# Patient Record
Sex: FEMALE | Race: BLACK OR AFRICAN AMERICAN | Employment: FULL TIME | URBAN - METROPOLITAN AREA
[De-identification: names, ages, dates, MRNs, and addresses within clinical notes are randomized per-mention and may not be internally consistent; named-entity substitution may affect disease eponyms.]

---

## 2017-05-17 ENCOUNTER — HOSPITAL ENCOUNTER (EMERGENCY)
Facility: HOSPITAL | Age: 32
Discharge: HOME | End: 2017-05-17
Attending: EMERGENCY MEDICINE
Payer: MEDICAID

## 2017-05-17 VITALS
HEART RATE: 82 BPM | WEIGHT: 264 LBS | OXYGEN SATURATION: 97 % | BODY MASS INDEX: 45.07 KG/M2 | RESPIRATION RATE: 18 BRPM | HEIGHT: 64 IN | TEMPERATURE: 98 F | SYSTOLIC BLOOD PRESSURE: 134 MMHG | DIASTOLIC BLOOD PRESSURE: 72 MMHG

## 2017-05-17 DIAGNOSIS — S80.02XA CONTUSION OF LEFT KNEE, INITIAL ENCOUNTER: ICD-10-CM
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
## 2017-05-17 PROCEDURE — 99282 EMERGENCY DEPT VISIT SF MDM: CPT

## 2017-05-17 RX ORDER — FLUTICASONE PROPIONATE 50 MCG
2 SPRAY, SUSPENSION (ML) NASAL DAILY
COMMUNITY

## 2017-05-18 NOTE — ED INITIAL ASSESSMENT (HPI)
Pt was shopping at [facility], fell over palate that was on the floor. Pt now has left armpit pain, and left knee pain. No LOC. No open wounds.

## 2017-05-18 NOTE — ED PROVIDER NOTES
Patient Seen in: [facility] AND [facility] Emergency Department    History   Patient presents with:  Fall (musculoskeletal, neurologic)      HPI    Patient presents after tripping on a pallet at a grocery store this afternoon and falling.   She states that she hi well-developed and well-nourished. No distress. HENT:   Head: Normocephalic and atraumatic. Eyes: Right eye exhibits no discharge. Left eye exhibits no discharge. Neck: Normal range of motion. Cardiovascular: Normal rate. No murmur heard.   Pulmo

## 2017-05-18 NOTE — ED NOTES
Pt states at [facility] this afternoon around 4pm when she was exiting and fell onto her face, left armpit and left knee all while injuring right back side. Pt rates pain 6/10. No abrasions or fluid palpated on knee. Ice pack provided.  Pt also denies LOC or hea

## 2018-12-22 ENCOUNTER — HOSPITAL ENCOUNTER (EMERGENCY)
Facility: HOSPITAL | Age: 33
Discharge: HOME | End: 2018-12-22
Attending: EMERGENCY MEDICINE
Payer: MEDICAID

## 2018-12-22 ENCOUNTER — APPOINTMENT (OUTPATIENT)
Dept: GENERAL RADIOLOGY | Facility: HOSPITAL | Age: 33
End: 2018-12-22
Attending: EMERGENCY MEDICINE
Payer: MEDICAID

## 2018-12-22 VITALS
HEIGHT: 64 IN | BODY MASS INDEX: 42.68 KG/M2 | SYSTOLIC BLOOD PRESSURE: 116 MMHG | OXYGEN SATURATION: 97 % | HEART RATE: 74 BPM | RESPIRATION RATE: 18 BRPM | TEMPERATURE: 98 F | DIASTOLIC BLOOD PRESSURE: 80 MMHG | WEIGHT: 250 LBS

## 2018-12-22 DIAGNOSIS — M94.0 COSTOCHONDRITIS: Primary | ICD-10-CM

## 2018-12-22 PROCEDURE — 84484 ASSAY OF TROPONIN QUANT: CPT | Performed by: EMERGENCY MEDICINE

## 2018-12-22 PROCEDURE — 85025 COMPLETE CBC W/AUTO DIFF WBC: CPT | Performed by: EMERGENCY MEDICINE

## 2018-12-22 PROCEDURE — 83880 ASSAY OF NATRIURETIC PEPTIDE: CPT | Performed by: EMERGENCY MEDICINE

## 2018-12-22 PROCEDURE — 99285 EMERGENCY DEPT VISIT HI MDM: CPT

## 2018-12-22 PROCEDURE — 96374 THER/PROPH/DIAG INJ IV PUSH: CPT

## 2018-12-22 PROCEDURE — 71046 X-RAY EXAM CHEST 2 VIEWS: CPT | Performed by: EMERGENCY MEDICINE

## 2018-12-22 PROCEDURE — 93010 ELECTROCARDIOGRAM REPORT: CPT | Performed by: EMERGENCY MEDICINE

## 2018-12-22 PROCEDURE — 93005 ELECTROCARDIOGRAM TRACING: CPT

## 2018-12-22 PROCEDURE — 80048 BASIC METABOLIC PNL TOTAL CA: CPT | Performed by: EMERGENCY MEDICINE

## 2018-12-22 RX ORDER — IBUPROFEN 600 MG/1
600 TABLET ORAL EVERY 6 HOURS PRN
Qty: 30 TABLET | Refills: 0 | Status: SHIPPED | OUTPATIENT
Start: 2018-12-22 | End: 2018-12-29

## 2018-12-22 RX ORDER — MONTELUKAST SODIUM 10 MG/1
10 TABLET ORAL NIGHTLY
COMMUNITY

## 2018-12-22 RX ORDER — FAMOTIDINE 10 MG
10 TABLET ORAL 2 TIMES DAILY
COMMUNITY

## 2018-12-22 RX ORDER — KETOROLAC TROMETHAMINE 30 MG/ML
30 INJECTION, SOLUTION INTRAMUSCULAR; INTRAVENOUS ONCE
Status: COMPLETED | OUTPATIENT
Start: 2018-12-22 | End: 2018-12-22

## 2018-12-26 NOTE — ED PROVIDER NOTES
Patient Seen in: [facility] AND [facility] Emergency Department    History   Patient presents with:  Chest Pain Angina (cardiovascular)    Stated Complaint: chest pain    HPI    34year old female who presents with chest pain after peristernal for the past 3-4 d Cardiovascular: Normal rate, regular rhythm, normal heart sounds and intact distal pulses. No murmur heard. Pulmonary/Chest: Effort normal and breath sounds normal. No accessory muscle usage or stridor. No respiratory distress.  She has no decreased hood 12/22/18 : 74  , sinus, normal for rate and rhythm    Radiology findings: Xr Chest Pa + Lat Chest (cpt=71046)    Result Date: 12/22/2018  CONCLUSION: Normal examination.       Dictated by (CST): Veronica Canada MD on 12/22/2018 at 18:42     Approved by (CS

## 2019-03-04 ENCOUNTER — HOSPITAL ENCOUNTER (EMERGENCY)
Facility: HOSPITAL | Age: 34
Discharge: HOME | End: 2019-03-04
Payer: MEDICAID

## 2019-03-04 ENCOUNTER — APPOINTMENT (OUTPATIENT)
Dept: GENERAL RADIOLOGY | Facility: HOSPITAL | Age: 34
End: 2019-03-04
Payer: MEDICAID

## 2019-03-04 VITALS
RESPIRATION RATE: 13 BRPM | SYSTOLIC BLOOD PRESSURE: 117 MMHG | TEMPERATURE: 98 F | HEIGHT: 64 IN | HEART RATE: 84 BPM | WEIGHT: 245 LBS | DIASTOLIC BLOOD PRESSURE: 55 MMHG | OXYGEN SATURATION: 98 % | BODY MASS INDEX: 41.83 KG/M2

## 2019-03-04 DIAGNOSIS — R07.89 CHEST PAIN, MUSCULOSKELETAL: Primary | ICD-10-CM

## 2019-03-04 LAB
ANION GAP SERPL CALC-SCNC: 7 MMOL/L (ref 0–18)
BASOPHILS # BLD AUTO: 0.04 X10(3) UL (ref 0–0.2)
BASOPHILS NFR BLD AUTO: 0.4 %
BUN BLD-MCNC: 12 MG/DL (ref 7–18)
BUN/CREAT SERPL: 17.6 (ref 10–20)
CALCIUM BLD-MCNC: 8.4 MG/DL (ref 8.5–10.1)
CHLORIDE SERPL-SCNC: 111 MMOL/L (ref 98–107)
CO2 SERPL-SCNC: 25 MMOL/L (ref 21–32)
CREAT BLD-MCNC: 0.68 MG/DL (ref 0.55–1.02)
D DIMER PPP FEU-MCNC: 0.47 MCG/ML (ref ?–0.5)
DEPRECATED RDW RBC AUTO: 38.2 FL (ref 35.1–46.3)
EOSINOPHIL # BLD AUTO: 0.16 X10(3) UL (ref 0–0.7)
EOSINOPHIL NFR BLD AUTO: 1.5 %
ERYTHROCYTE [DISTWIDTH] IN BLOOD BY AUTOMATED COUNT: 11.9 % (ref 11–15)
GLUCOSE BLD-MCNC: 100 MG/DL (ref 70–99)
HCT VFR BLD AUTO: 40.2 % (ref 35–48)
HGB BLD-MCNC: 13.3 G/DL (ref 12–16)
IMM GRANULOCYTES # BLD AUTO: 0.02 X10(3) UL (ref 0–1)
IMM GRANULOCYTES NFR BLD: 0.2 %
LYMPHOCYTES # BLD AUTO: 3.33 X10(3) UL (ref 1–4)
LYMPHOCYTES NFR BLD AUTO: 30.7 %
MCH RBC QN AUTO: 29.1 PG (ref 26–34)
MCHC RBC AUTO-ENTMCNC: 33.1 G/DL (ref 31–37)
MCV RBC AUTO: 88 FL (ref 80–100)
MONOCYTES # BLD AUTO: 0.8 X10(3) UL (ref 0.1–1)
MONOCYTES NFR BLD AUTO: 7.4 %
NEUTROPHILS # BLD AUTO: 6.5 X10 (3) UL (ref 1.5–7.7)
NEUTROPHILS # BLD AUTO: 6.5 X10(3) UL (ref 1.5–7.7)
NEUTROPHILS NFR BLD AUTO: 59.8 %
OSMOLALITY SERPL CALC.SUM OF ELEC: 296 MOSM/KG (ref 275–295)
PLATELET # BLD AUTO: 319 10(3)UL (ref 150–450)
POTASSIUM SERPL-SCNC: 3.8 MMOL/L (ref 3.5–5.1)
RBC # BLD AUTO: 4.57 X10(6)UL (ref 3.8–5.3)
SODIUM SERPL-SCNC: 143 MMOL/L (ref 136–145)
TROPONIN I SERPL-MCNC: <0.045 NG/ML (ref ?–0.04)
WBC # BLD AUTO: 10.9 X10(3) UL (ref 4–11)

## 2019-03-04 PROCEDURE — 93005 ELECTROCARDIOGRAM TRACING: CPT

## 2019-03-04 PROCEDURE — 84484 ASSAY OF TROPONIN QUANT: CPT | Performed by: EMERGENCY MEDICINE

## 2019-03-04 PROCEDURE — 99285 EMERGENCY DEPT VISIT HI MDM: CPT

## 2019-03-04 PROCEDURE — 85379 FIBRIN DEGRADATION QUANT: CPT | Performed by: EMERGENCY MEDICINE

## 2019-03-04 PROCEDURE — 71046 X-RAY EXAM CHEST 2 VIEWS: CPT

## 2019-03-04 PROCEDURE — 80048 BASIC METABOLIC PNL TOTAL CA: CPT | Performed by: EMERGENCY MEDICINE

## 2019-03-04 PROCEDURE — 36415 COLL VENOUS BLD VENIPUNCTURE: CPT

## 2019-03-04 PROCEDURE — 93010 ELECTROCARDIOGRAM REPORT: CPT | Performed by: INTERNAL MEDICINE

## 2019-03-04 PROCEDURE — 85025 COMPLETE CBC W/AUTO DIFF WBC: CPT | Performed by: EMERGENCY MEDICINE

## 2019-03-04 RX ORDER — CYCLOBENZAPRINE HCL 10 MG
10 TABLET ORAL 3 TIMES DAILY PRN
Qty: 15 TABLET | Refills: 0 | Status: SHIPPED | OUTPATIENT
Start: 2019-03-04 | End: 2019-03-11

## 2019-03-04 NOTE — ED INITIAL ASSESSMENT (HPI)
Patient complains of chest pain with sally x3 months, states she was seen at [facility] last week and dx with costochondritis

## 2019-03-05 NOTE — ED PROVIDER NOTES
Patient Seen in: [facility] AND [facility] Emergency Department    History   Patient presents with:  Chest Pain Angina (cardiovascular)    Stated Complaint: chest pain    HPI    The patient is a 82-year-old female who has had at least 2 weeks of sharp left radha HENT:   Head: Normocephalic and atraumatic. Mouth/Throat: Oropharynx is clear and moist.   Eyes: Conjunctivae and EOM are normal. Pupils are equal, round, and reactive to light. Neck: Normal range of motion. Neck supple. No JVD present.    Gwendolyn Mansfield CBC W/ DIFFERENTIAL[[id number]]                              Final result                 Please view results for these tests on the individual orders.    RAINBOW DRAW BLUE   RAINBOW DRAW LAVENDER   RAINBOW DRAW LIGHT GREEN   RAINBOW DRAW GOLD   CBC W/ DIFFE

## 2019-10-31 ENCOUNTER — APPOINTMENT (OUTPATIENT)
Dept: GENERAL RADIOLOGY | Facility: HOSPITAL | Age: 34
End: 2019-10-31
Attending: EMERGENCY MEDICINE
Payer: MEDICAID

## 2019-10-31 ENCOUNTER — HOSPITAL ENCOUNTER (EMERGENCY)
Facility: HOSPITAL | Age: 34
Discharge: HOME | End: 2019-10-31
Attending: EMERGENCY MEDICINE
Payer: MEDICAID

## 2019-10-31 VITALS
TEMPERATURE: 98 F | BODY MASS INDEX: 38.93 KG/M2 | HEART RATE: 80 BPM | WEIGHT: 228 LBS | DIASTOLIC BLOOD PRESSURE: 65 MMHG | HEIGHT: 64 IN | RESPIRATION RATE: 18 BRPM | SYSTOLIC BLOOD PRESSURE: 129 MMHG | OXYGEN SATURATION: 100 %

## 2019-10-31 DIAGNOSIS — M25.561 ACUTE PAIN OF RIGHT KNEE: Primary | ICD-10-CM

## 2019-10-31 PROCEDURE — 99283 EMERGENCY DEPT VISIT LOW MDM: CPT

## 2019-10-31 PROCEDURE — 73560 X-RAY EXAM OF KNEE 1 OR 2: CPT | Performed by: EMERGENCY MEDICINE

## 2019-11-01 NOTE — ED PROVIDER NOTES
Patient Seen in: [facility] AND [facility] Emergency Department      History   Patient presents with:  Knee Pain: Right knee pain    Stated Complaint: right knee pain    HPI    Patient is a 37-year-old female who presents to the emergency department with a chief diagnosis)    Disposition:  Discharge  10/31/2019  8:12 pm    Follow-up:  Lesa Rodriguez  [address]  [phone]    Schedule an appointment as soon as possible for a visit          Medications Prescribed:  Current

## 2021-11-12 ENCOUNTER — APPOINTMENT (OUTPATIENT)
Dept: GENERAL RADIOLOGY | Facility: HOSPITAL | Age: 36
End: 2021-11-12
Payer: MEDICAID

## 2021-11-12 ENCOUNTER — HOSPITAL ENCOUNTER (EMERGENCY)
Facility: HOSPITAL | Age: 36
Discharge: HOME | End: 2021-11-12
Payer: MEDICAID

## 2021-11-12 VITALS
TEMPERATURE: 98 F | DIASTOLIC BLOOD PRESSURE: 59 MMHG | HEART RATE: 90 BPM | OXYGEN SATURATION: 99 % | SYSTOLIC BLOOD PRESSURE: 127 MMHG | BODY MASS INDEX: 44 KG/M2 | WEIGHT: 256 LBS | RESPIRATION RATE: 16 BRPM

## 2021-11-12 DIAGNOSIS — S93.402A MILD SPRAIN OF LEFT ANKLE, INITIAL ENCOUNTER: Primary | ICD-10-CM

## 2021-11-12 PROCEDURE — 99283 EMERGENCY DEPT VISIT LOW MDM: CPT

## 2021-11-12 PROCEDURE — 73610 X-RAY EXAM OF ANKLE: CPT

## 2021-11-12 RX ORDER — IBUPROFEN 600 MG/1
600 TABLET ORAL EVERY 8 HOURS PRN
Qty: 15 TABLET | Refills: 0 | Status: SHIPPED | OUTPATIENT
Start: 2021-11-12 | End: 2021-11-19

## 2021-11-12 RX ORDER — IBUPROFEN 600 MG/1
600 TABLET ORAL ONCE
Status: COMPLETED | OUTPATIENT
Start: 2021-11-12 | End: 2021-11-12

## 2021-11-13 NOTE — ED PROVIDER NOTES
Patient Seen in: [facility] AND [facility] Emergency Department    History   Patient presents with:  Leg or Foot Injury    Stated Complaint: ankle pain     HPI    HPI: Antonino Nolan is a 31year old female who presents here with complains of left ankle pain.  No paresthesias  EXTREMITIES: R ankle with FROM, no bony tenderness, FROM  . 2+ distal pulses. NEURO:Sensation to touch is intact. SKIN: No open wounds, no rashes. PSYCH: Normal affect. Calm and cooperative.     Differential diagnosis to include fracture v

## 2021-11-13 NOTE — ED INITIAL ASSESSMENT (HPI)
3 days of pain to L ankle, pt reports recent injury to R knee which may be causing her to shift more weight onto L side

## 2021-12-14 ENCOUNTER — HOSPITAL ENCOUNTER (EMERGENCY)
Facility: HOSPITAL | Age: 36
Discharge: HOME | End: 2021-12-14
Payer: MEDICAID

## 2021-12-14 ENCOUNTER — APPOINTMENT (OUTPATIENT)
Dept: GENERAL RADIOLOGY | Facility: HOSPITAL | Age: 36
End: 2021-12-14
Payer: MEDICAID

## 2021-12-14 VITALS
RESPIRATION RATE: 18 BRPM | SYSTOLIC BLOOD PRESSURE: 129 MMHG | TEMPERATURE: 98 F | HEART RATE: 108 BPM | OXYGEN SATURATION: 100 % | DIASTOLIC BLOOD PRESSURE: 61 MMHG

## 2021-12-14 DIAGNOSIS — J45.901 EXACERBATION OF ASTHMA, UNSPECIFIED ASTHMA SEVERITY, UNSPECIFIED WHETHER PERSISTENT: Primary | ICD-10-CM

## 2021-12-14 PROCEDURE — 94644 CONT INHLJ TX 1ST HOUR: CPT

## 2021-12-14 PROCEDURE — 99284 EMERGENCY DEPT VISIT MOD MDM: CPT

## 2021-12-14 PROCEDURE — 71045 X-RAY EXAM CHEST 1 VIEW: CPT

## 2021-12-14 RX ORDER — ALBUTEROL SULFATE 90 UG/1
2 AEROSOL, METERED RESPIRATORY (INHALATION) EVERY 4 HOURS PRN
Qty: 1 EACH | Refills: 0 | Status: SHIPPED | OUTPATIENT
Start: 2021-12-14 | End: 2022-01-13

## 2021-12-14 RX ORDER — PREDNISONE 20 MG/1
40 TABLET ORAL DAILY
Qty: 10 TABLET | Refills: 0 | Status: SHIPPED | OUTPATIENT
Start: 2021-12-14 | End: 2021-12-19

## 2021-12-14 RX ORDER — ALBUTEROL SULFATE 2.5 MG/3ML
10 SOLUTION RESPIRATORY (INHALATION) CONTINUOUS
Status: DISCONTINUED | OUTPATIENT
Start: 2021-12-14 | End: 2021-12-14

## 2021-12-14 RX ORDER — PREDNISONE 20 MG/1
40 TABLET ORAL ONCE
Status: COMPLETED | OUTPATIENT
Start: 2021-12-14 | End: 2021-12-14

## 2021-12-15 NOTE — ED INITIAL ASSESSMENT (HPI)
Pt to ED for dry cough and shortness of breath x1 week, was referred to ED by pulmonologist. Hx of asthma, no relief with rescue inhaler and neb tx. Denies fevers. Pt had a negative covid test today, results with patient.

## 2021-12-15 NOTE — ED PROVIDER NOTES
Patient Seen in: [facility] AND [facility] Emergency Department      History   Patient presents with:  Cough/URI    Stated Complaint: cough, difficulty breathing    Subjective:   HPI    72-year-old female with past medical history significant for asthma presents wheezing bilateral without retractions. No chest wall tenderness  Abdominal: Nontender. Nondistended. Soft. Bowel sounds are normal.   Back:   : Musculoskeletal: Normal range of motion. No deformity.    Lymphadenopathy: No sig cervical LAD   Neurologi

## 2021-12-15 NOTE — ED QUICK NOTES
Pt states that she had a Rapid and PCR test today at work. Both with negative results. Pt labored breathing.  O2 sat 100% RA

## 2022-01-14 ENCOUNTER — APPOINTMENT (OUTPATIENT)
Dept: GENERAL RADIOLOGY | Facility: HOSPITAL | Age: 37
End: 2022-01-14
Attending: NURSE PRACTITIONER
Payer: MEDICAID

## 2022-01-14 ENCOUNTER — HOSPITAL ENCOUNTER (EMERGENCY)
Facility: HOSPITAL | Age: 37
Discharge: HOME | End: 2022-01-14
Payer: MEDICAID

## 2022-01-14 VITALS
DIASTOLIC BLOOD PRESSURE: 56 MMHG | SYSTOLIC BLOOD PRESSURE: 103 MMHG | WEIGHT: 268 LBS | RESPIRATION RATE: 20 BRPM | OXYGEN SATURATION: 95 % | BODY MASS INDEX: 45.75 KG/M2 | HEART RATE: 105 BPM | TEMPERATURE: 99 F | HEIGHT: 64 IN

## 2022-01-14 DIAGNOSIS — R06.02 SHORTNESS OF BREATH: Primary | ICD-10-CM

## 2022-01-14 LAB
ANION GAP SERPL CALC-SCNC: 7 MMOL/L (ref 0–18)
B-HCG UR QL: NEGATIVE
BASOPHILS # BLD AUTO: 0.09 X10(3) UL (ref 0–0.2)
BASOPHILS NFR BLD AUTO: 0.6 %
BUN BLD-MCNC: 16 MG/DL (ref 7–18)
BUN/CREAT SERPL: 18.8 (ref 10–20)
CALCIUM BLD-MCNC: 9.3 MG/DL (ref 8.5–10.1)
CHLORIDE SERPL-SCNC: 106 MMOL/L (ref 98–112)
CO2 SERPL-SCNC: 26 MMOL/L (ref 21–32)
CREAT BLD-MCNC: 0.85 MG/DL
D DIMER PPP FEU-MCNC: 0.3 UG/ML FEU (ref ?–0.5)
DEPRECATED RDW RBC AUTO: 42.1 FL (ref 35.1–46.3)
EOSINOPHIL # BLD AUTO: 0 X10(3) UL (ref 0–0.7)
EOSINOPHIL NFR BLD AUTO: 0 %
ERYTHROCYTE [DISTWIDTH] IN BLOOD BY AUTOMATED COUNT: 12.9 % (ref 11–15)
GLUCOSE BLD-MCNC: 116 MG/DL (ref 70–99)
HCT VFR BLD AUTO: 42.4 %
HGB BLD-MCNC: 13.8 G/DL
IMM GRANULOCYTES # BLD AUTO: 0.43 X10(3) UL (ref 0–1)
IMM GRANULOCYTES NFR BLD: 2.7 %
LYMPHOCYTES # BLD AUTO: 1.57 X10(3) UL (ref 1–4)
LYMPHOCYTES NFR BLD AUTO: 10 %
MCH RBC QN AUTO: 29.2 PG (ref 26–34)
MCHC RBC AUTO-ENTMCNC: 32.5 G/DL (ref 31–37)
MCV RBC AUTO: 89.6 FL
MONOCYTES # BLD AUTO: 0.21 X10(3) UL (ref 0.1–1)
MONOCYTES NFR BLD AUTO: 1.3 %
NEUTROPHILS # BLD AUTO: 13.41 X10 (3) UL (ref 1.5–7.7)
NEUTROPHILS # BLD AUTO: 13.41 X10(3) UL (ref 1.5–7.7)
NEUTROPHILS NFR BLD AUTO: 85.4 %
NT-PROBNP SERPL-MCNC: 15 PG/ML (ref ?–125)
OSMOLALITY SERPL CALC.SUM OF ELEC: 290 MOSM/KG (ref 275–295)
PLATELET # BLD AUTO: 363 10(3)UL (ref 150–450)
POTASSIUM SERPL-SCNC: 4.1 MMOL/L (ref 3.5–5.1)
RBC # BLD AUTO: 4.73 X10(6)UL
SODIUM SERPL-SCNC: 139 MMOL/L (ref 136–145)
TROPONIN I HIGH SENSITIVITY: 3 NG/L
WBC # BLD AUTO: 15.7 X10(3) UL (ref 4–11)

## 2022-01-14 PROCEDURE — 85025 COMPLETE CBC W/AUTO DIFF WBC: CPT | Performed by: NURSE PRACTITIONER

## 2022-01-14 PROCEDURE — 80048 BASIC METABOLIC PNL TOTAL CA: CPT | Performed by: NURSE PRACTITIONER

## 2022-01-14 PROCEDURE — 99285 EMERGENCY DEPT VISIT HI MDM: CPT

## 2022-01-14 PROCEDURE — 84484 ASSAY OF TROPONIN QUANT: CPT | Performed by: NURSE PRACTITIONER

## 2022-01-14 PROCEDURE — 93010 ELECTROCARDIOGRAM REPORT: CPT | Performed by: INTERNAL MEDICINE

## 2022-01-14 PROCEDURE — 85379 FIBRIN DEGRADATION QUANT: CPT | Performed by: NURSE PRACTITIONER

## 2022-01-14 PROCEDURE — 94644 CONT INHLJ TX 1ST HOUR: CPT

## 2022-01-14 PROCEDURE — 93005 ELECTROCARDIOGRAM TRACING: CPT

## 2022-01-14 PROCEDURE — 71045 X-RAY EXAM CHEST 1 VIEW: CPT | Performed by: NURSE PRACTITIONER

## 2022-01-14 PROCEDURE — 83880 ASSAY OF NATRIURETIC PEPTIDE: CPT | Performed by: NURSE PRACTITIONER

## 2022-01-14 PROCEDURE — 96374 THER/PROPH/DIAG INJ IV PUSH: CPT

## 2022-01-14 PROCEDURE — 81025 URINE PREGNANCY TEST: CPT

## 2022-01-14 RX ORDER — METHYLPREDNISOLONE SODIUM SUCCINATE 125 MG/2ML
125 INJECTION, POWDER, LYOPHILIZED, FOR SOLUTION INTRAMUSCULAR; INTRAVENOUS ONCE
Status: COMPLETED | OUTPATIENT
Start: 2022-01-14 | End: 2022-01-14

## 2022-01-14 RX ORDER — ALBUTEROL SULFATE 2.5 MG/3ML
15 SOLUTION RESPIRATORY (INHALATION) ONCE
Status: COMPLETED | OUTPATIENT
Start: 2022-01-14 | End: 2022-01-14

## 2022-01-14 RX ORDER — ALBUTEROL SULFATE 2.5 MG/3ML
SOLUTION RESPIRATORY (INHALATION)
Status: COMPLETED
Start: 2022-01-14 | End: 2022-01-14

## 2022-01-14 NOTE — ED INITIAL ASSESSMENT (HPI)
Patient had asthma attack and was treated in the ED. Patient states she had then contracted covid and continues to have asthma exacerbation. Patient is on prednisone and neb treatments with minimal relief.

## 2022-01-14 NOTE — ED PROVIDER NOTES
Patient Seen in: [facility] AND [facility] Emergency Department    History   Patient presents with:  Difficulty Breathing    Stated Complaint: sob    HPI    HPI: Tracey Melendez is a 31year old female with a history of asthma who presents with wheezing and a cou BMI 46.00 kg/m²   PULSE OX 95        Physical Exam  Constitutional: Well-appearing does not appear to be in distress  Eyes: EOMI, PERRL, conjunctiva normal.  HENT: Atraumatic, oropharynx moist. Neck supple.   Respiratory: Bilateral expiratory wheeze, short she has been on oral steroids for the last 4 weeks currently on 50 mg of p.o. prednisone with no improvement of symptoms.   Unremarkable CBC, BMP  Normal BNP, D-dimer, troponin  EKG with no acute elevation noted in version normal rate rhythm and axis  Given

## 2022-04-19 ENCOUNTER — HOSPITAL ENCOUNTER (EMERGENCY)
Facility: HOSPITAL | Age: 37
Discharge: HOME | End: 2022-04-19
Attending: EMERGENCY MEDICINE
Payer: MEDICAID

## 2022-04-19 VITALS
OXYGEN SATURATION: 96 % | RESPIRATION RATE: 22 BRPM | SYSTOLIC BLOOD PRESSURE: 124 MMHG | HEART RATE: 97 BPM | WEIGHT: 278 LBS | HEIGHT: 64 IN | DIASTOLIC BLOOD PRESSURE: 87 MMHG | TEMPERATURE: 98 F | BODY MASS INDEX: 47.46 KG/M2

## 2022-04-19 DIAGNOSIS — J45.901 MODERATE ASTHMA WITH EXACERBATION, UNSPECIFIED WHETHER PERSISTENT: Primary | ICD-10-CM

## 2022-04-19 LAB — SARS-COV-2 RNA RESP QL NAA+PROBE: NOT DETECTED

## 2022-04-19 PROCEDURE — 99284 EMERGENCY DEPT VISIT MOD MDM: CPT

## 2022-04-19 PROCEDURE — 94640 AIRWAY INHALATION TREATMENT: CPT

## 2022-04-19 RX ORDER — PREDNISONE 20 MG/1
40 TABLET ORAL DAILY
Qty: 6 TABLET | Refills: 0 | Status: SHIPPED | OUTPATIENT
Start: 2022-04-19 | End: 2022-04-22

## 2022-04-19 RX ORDER — PREDNISONE 20 MG/1
60 TABLET ORAL ONCE
Status: COMPLETED | OUTPATIENT
Start: 2022-04-19 | End: 2022-04-19

## 2022-04-19 RX ORDER — IPRATROPIUM BROMIDE AND ALBUTEROL SULFATE 2.5; .5 MG/3ML; MG/3ML
3 SOLUTION RESPIRATORY (INHALATION) ONCE
Status: COMPLETED | OUTPATIENT
Start: 2022-04-19 | End: 2022-04-19

## 2022-04-19 NOTE — ED INITIAL ASSESSMENT (HPI)
Patient ambulatory to ED with complaint of shortness of breath, cough since yesterday, URI symptoms. Hx of asthma. Albuterol tx prior to arrival.      Patient is AXOX4.

## 2022-04-29 ENCOUNTER — HOSPITAL ENCOUNTER (EMERGENCY)
Facility: HOSPITAL | Age: 37
Discharge: HOME | End: 2022-04-29
Payer: MEDICAID

## 2022-04-29 ENCOUNTER — APPOINTMENT (OUTPATIENT)
Dept: ULTRASOUND IMAGING | Facility: HOSPITAL | Age: 37
End: 2022-04-29
Attending: NURSE PRACTITIONER
Payer: MEDICAID

## 2022-04-29 VITALS
HEIGHT: 64.5 IN | BODY MASS INDEX: 47.9 KG/M2 | DIASTOLIC BLOOD PRESSURE: 85 MMHG | RESPIRATION RATE: 20 BRPM | HEART RATE: 108 BPM | TEMPERATURE: 98 F | WEIGHT: 284 LBS | SYSTOLIC BLOOD PRESSURE: 129 MMHG | OXYGEN SATURATION: 98 %

## 2022-04-29 DIAGNOSIS — L03.116 CELLULITIS OF LEFT LOWER EXTREMITY: Primary | ICD-10-CM

## 2022-04-29 LAB
ANION GAP SERPL CALC-SCNC: 7 MMOL/L (ref 0–18)
BASOPHILS # BLD AUTO: 0.06 X10(3) UL (ref 0–0.2)
BASOPHILS NFR BLD AUTO: 0.3 %
BUN BLD-MCNC: 21 MG/DL (ref 7–18)
BUN/CREAT SERPL: 17.9 (ref 10–20)
CALCIUM BLD-MCNC: 9.2 MG/DL (ref 8.5–10.1)
CHLORIDE SERPL-SCNC: 106 MMOL/L (ref 98–112)
CO2 SERPL-SCNC: 26 MMOL/L (ref 21–32)
CREAT BLD-MCNC: 1.17 MG/DL
CRP SERPL-MCNC: 1.28 MG/DL (ref ?–0.3)
DEPRECATED RDW RBC AUTO: 38.5 FL (ref 35.1–46.3)
EOSINOPHIL # BLD AUTO: 0.05 X10(3) UL (ref 0–0.7)
EOSINOPHIL NFR BLD AUTO: 0.3 %
ERYTHROCYTE [DISTWIDTH] IN BLOOD BY AUTOMATED COUNT: 11.9 % (ref 11–15)
GLUCOSE BLD-MCNC: 84 MG/DL (ref 70–99)
HCT VFR BLD AUTO: 43.2 %
HGB BLD-MCNC: 14.1 G/DL
IMM GRANULOCYTES # BLD AUTO: 0.23 X10(3) UL (ref 0–1)
IMM GRANULOCYTES NFR BLD: 1.3 %
LYMPHOCYTES # BLD AUTO: 3.92 X10(3) UL (ref 1–4)
LYMPHOCYTES NFR BLD AUTO: 21.7 %
MCH RBC QN AUTO: 28.9 PG (ref 26–34)
MCHC RBC AUTO-ENTMCNC: 32.6 G/DL (ref 31–37)
MCV RBC AUTO: 88.5 FL
MONOCYTES # BLD AUTO: 1.5 X10(3) UL (ref 0.1–1)
MONOCYTES NFR BLD AUTO: 8.3 %
NEUTROPHILS # BLD AUTO: 12.27 X10 (3) UL (ref 1.5–7.7)
NEUTROPHILS # BLD AUTO: 12.27 X10(3) UL (ref 1.5–7.7)
NEUTROPHILS NFR BLD AUTO: 68.1 %
OSMOLALITY SERPL CALC.SUM OF ELEC: 290 MOSM/KG (ref 275–295)
PLATELET # BLD AUTO: 349 10(3)UL (ref 150–450)
POTASSIUM SERPL-SCNC: 3.7 MMOL/L (ref 3.5–5.1)
RBC # BLD AUTO: 4.88 X10(6)UL
SODIUM SERPL-SCNC: 139 MMOL/L (ref 136–145)
WBC # BLD AUTO: 18 X10(3) UL (ref 4–11)

## 2022-04-29 PROCEDURE — 99284 EMERGENCY DEPT VISIT MOD MDM: CPT

## 2022-04-29 PROCEDURE — 93970 EXTREMITY STUDY: CPT | Performed by: NURSE PRACTITIONER

## 2022-04-29 PROCEDURE — 85025 COMPLETE CBC W/AUTO DIFF WBC: CPT | Performed by: NURSE PRACTITIONER

## 2022-04-29 PROCEDURE — 86140 C-REACTIVE PROTEIN: CPT | Performed by: NURSE PRACTITIONER

## 2022-04-29 PROCEDURE — 80048 BASIC METABOLIC PNL TOTAL CA: CPT | Performed by: NURSE PRACTITIONER

## 2022-04-29 PROCEDURE — 36415 COLL VENOUS BLD VENIPUNCTURE: CPT

## 2022-04-29 RX ORDER — CEPHALEXIN 500 MG/1
500 CAPSULE ORAL 4 TIMES DAILY
Qty: 40 CAPSULE | Refills: 0 | Status: SHIPPED | OUTPATIENT
Start: 2022-04-29 | End: 2022-05-09

## 2022-04-30 NOTE — ED INITIAL ASSESSMENT (HPI)
Pt reports left ankle swelling for the past 3-4 weeks, pt reports recent prednisone use for asthma, pt reports hx of lymphedema. Pt denies recent travel. Pt denies trauma.

## 2022-05-24 VITALS
OXYGEN SATURATION: 94 % | DIASTOLIC BLOOD PRESSURE: 85 MMHG | SYSTOLIC BLOOD PRESSURE: 125 MMHG | HEART RATE: 103 BPM | TEMPERATURE: 97 F | HEIGHT: 64 IN | RESPIRATION RATE: 20 BRPM | WEIGHT: 288 LBS | BODY MASS INDEX: 49.17 KG/M2

## 2022-05-24 PROCEDURE — 99282 EMERGENCY DEPT VISIT SF MDM: CPT

## 2022-05-25 ENCOUNTER — HOSPITAL ENCOUNTER (EMERGENCY)
Facility: HOSPITAL | Age: 37
Discharge: HOME | End: 2022-05-25
Attending: EMERGENCY MEDICINE
Payer: MEDICAID

## 2022-05-25 DIAGNOSIS — L85.3 DRY SKIN DERMATITIS: Primary | ICD-10-CM

## 2022-05-25 NOTE — ED INITIAL ASSESSMENT (HPI)
Patient reports having cellulitis for a couple of months. States it has been treated but keeps coming back. Reports walking and the top of her left foot split open.

## 2023-05-09 ENCOUNTER — HOSPITAL ENCOUNTER (EMERGENCY)
Facility: HOSPITAL | Age: 38
Discharge: HOME | End: 2023-05-09
Attending: EMERGENCY MEDICINE
Payer: MEDICAID

## 2023-05-09 VITALS
HEIGHT: 65 IN | TEMPERATURE: 99 F | HEART RATE: 76 BPM | OXYGEN SATURATION: 100 % | SYSTOLIC BLOOD PRESSURE: 135 MMHG | RESPIRATION RATE: 18 BRPM | BODY MASS INDEX: 48 KG/M2 | DIASTOLIC BLOOD PRESSURE: 80 MMHG

## 2023-05-09 DIAGNOSIS — K13.0 LIP PAIN: Primary | ICD-10-CM

## 2023-05-09 PROCEDURE — 99284 EMERGENCY DEPT VISIT MOD MDM: CPT

## 2023-05-09 PROCEDURE — 99283 EMERGENCY DEPT VISIT LOW MDM: CPT

## 2023-05-09 RX ORDER — FUROSEMIDE 40 MG/1
40 TABLET ORAL DAILY
COMMUNITY

## 2023-05-09 RX ORDER — PREDNISONE 20 MG/1
40 TABLET ORAL DAILY
Qty: 6 TABLET | Refills: 0 | Status: SHIPPED | OUTPATIENT
Start: 2023-05-09 | End: 2023-05-12

## 2023-05-09 RX ORDER — DOXYCYCLINE HYCLATE 100 MG/1
100 CAPSULE ORAL 2 TIMES DAILY
COMMUNITY

## 2023-05-09 RX ORDER — DIPHENHYDRAMINE HCL 25 MG
25 CAPSULE ORAL ONCE
Status: COMPLETED | OUTPATIENT
Start: 2023-05-09 | End: 2023-05-09

## 2023-05-09 RX ORDER — DIPHENHYDRAMINE HCL 25 MG
25 CAPSULE ORAL EVERY 6 HOURS PRN
Qty: 20 CAPSULE | Refills: 0 | Status: SHIPPED | OUTPATIENT
Start: 2023-05-09 | End: 2023-05-14

## 2023-05-09 RX ORDER — POTASSIUM CHLORIDE 1500 MG/1
1 TABLET, EXTENDED RELEASE ORAL DAILY
COMMUNITY

## 2023-05-09 RX ORDER — LIDOCAINE HYDROCHLORIDE 20 MG/ML
5 SOLUTION OROPHARYNGEAL ONCE
Status: COMPLETED | OUTPATIENT
Start: 2023-05-09 | End: 2023-05-09

## 2023-05-09 NOTE — ED INITIAL ASSESSMENT (HPI)
S: pt states that at 1200 today whilst at her [employer], started to feel burning to her lower lip and bilateral hand tingling that is traveling up her arms.   Pt denies vomiting, sob, hives or any tingling or swelling of the throat    B: na    A: bottom lip is red but not swollen, no other significant findings    R: none

## 2023-05-09 NOTE — ED QUICK NOTES
Medicated per order; pt states her lip swelling has gotten better. Patient speaking in full sentences. Denies any c/o. Lungs cta. VSS. Pt handed paperwork and educated patient importance to f/u. Educated about script education.

## 2023-05-16 ENCOUNTER — APPOINTMENT (OUTPATIENT)
Dept: GENERAL RADIOLOGY | Facility: HOSPITAL | Age: 38
End: 2023-05-16
Payer: MEDICAID

## 2023-05-16 ENCOUNTER — HOSPITAL ENCOUNTER (EMERGENCY)
Facility: HOSPITAL | Age: 38
Discharge: HOME | End: 2023-05-16
Attending: EMERGENCY MEDICINE
Payer: MEDICAID

## 2023-05-16 VITALS
HEART RATE: 83 BPM | DIASTOLIC BLOOD PRESSURE: 66 MMHG | WEIGHT: 264 LBS | HEIGHT: 64 IN | OXYGEN SATURATION: 99 % | BODY MASS INDEX: 45.07 KG/M2 | TEMPERATURE: 98 F | SYSTOLIC BLOOD PRESSURE: 103 MMHG | RESPIRATION RATE: 15 BRPM

## 2023-05-16 DIAGNOSIS — R07.9 CHEST PAIN, UNSPECIFIED TYPE: Primary | ICD-10-CM

## 2023-05-16 LAB
ALBUMIN SERPL-MCNC: 3.3 G/DL (ref 3.4–5)
ALBUMIN/GLOB SERPL: 0.7 {RATIO} (ref 1–2)
ALP LIVER SERPL-CCNC: 75 U/L
ALT SERPL-CCNC: 36 U/L
ANION GAP SERPL CALC-SCNC: 3 MMOL/L (ref 0–18)
AST SERPL-CCNC: 21 U/L (ref 15–37)
ATRIAL RATE: 77 BPM
ATRIAL RATE: 78 BPM
B-HCG UR QL: NEGATIVE
BASOPHILS # BLD AUTO: 0.04 X10(3) UL (ref 0–0.2)
BASOPHILS NFR BLD AUTO: 0.4 %
BILIRUB SERPL-MCNC: 0.5 MG/DL (ref 0.1–2)
BILIRUB UR QL: NEGATIVE
BUN BLD-MCNC: 13 MG/DL (ref 7–18)
BUN/CREAT SERPL: 17.6 (ref 10–20)
CALCIUM BLD-MCNC: 8.9 MG/DL (ref 8.5–10.1)
CHLORIDE SERPL-SCNC: 106 MMOL/L (ref 98–112)
CLARITY UR: CLEAR
CO2 SERPL-SCNC: 28 MMOL/L (ref 21–32)
COLOR UR: YELLOW
CREAT BLD-MCNC: 0.74 MG/DL
D DIMER PPP FEU-MCNC: 0.48 UG/ML FEU (ref ?–0.5)
DEPRECATED RDW RBC AUTO: 40 FL (ref 35.1–46.3)
EOSINOPHIL # BLD AUTO: 0.24 X10(3) UL (ref 0–0.7)
EOSINOPHIL NFR BLD AUTO: 2.5 %
ERYTHROCYTE [DISTWIDTH] IN BLOOD BY AUTOMATED COUNT: 12.4 % (ref 11–15)
GFR SERPLBLD BASED ON 1.73 SQ M-ARVRAT: 108 ML/MIN/1.73M2 (ref 60–?)
GLOBULIN PLAS-MCNC: 4.6 G/DL (ref 2.8–4.4)
GLUCOSE BLD-MCNC: 86 MG/DL (ref 70–99)
GLUCOSE UR-MCNC: NORMAL MG/DL
HCT VFR BLD AUTO: 44.2 %
HGB BLD-MCNC: 14.2 G/DL
HGB UR QL STRIP.AUTO: NEGATIVE
IMM GRANULOCYTES # BLD AUTO: 0.08 X10(3) UL (ref 0–1)
IMM GRANULOCYTES NFR BLD: 0.8 %
KETONES UR-MCNC: NEGATIVE MG/DL
LEUKOCYTE ESTERASE UR QL STRIP.AUTO: NEGATIVE
LYMPHOCYTES # BLD AUTO: 3.05 X10(3) UL (ref 1–4)
LYMPHOCYTES NFR BLD AUTO: 31.3 %
MCH RBC QN AUTO: 28.5 PG (ref 26–34)
MCHC RBC AUTO-ENTMCNC: 32.1 G/DL (ref 31–37)
MCV RBC AUTO: 88.6 FL
MONOCYTES # BLD AUTO: 0.7 X10(3) UL (ref 0.1–1)
MONOCYTES NFR BLD AUTO: 7.2 %
NEUTROPHILS # BLD AUTO: 5.63 X10 (3) UL (ref 1.5–7.7)
NEUTROPHILS # BLD AUTO: 5.63 X10(3) UL (ref 1.5–7.7)
NEUTROPHILS NFR BLD AUTO: 57.8 %
NITRITE UR QL STRIP.AUTO: NEGATIVE
OSMOLALITY SERPL CALC.SUM OF ELEC: 283 MOSM/KG (ref 275–295)
P AXIS: 31 DEGREES
P AXIS: 46 DEGREES
P-R INTERVAL: 154 MS
P-R INTERVAL: 156 MS
PH UR: 8 [PH] (ref 5–8)
PLATELET # BLD AUTO: 346 10(3)UL (ref 150–450)
POTASSIUM SERPL-SCNC: 4.3 MMOL/L (ref 3.5–5.1)
PROT SERPL-MCNC: 7.9 G/DL (ref 6.4–8.2)
PROT UR-MCNC: 50 MG/DL
Q-T INTERVAL: 366 MS
Q-T INTERVAL: 380 MS
QRS DURATION: 86 MS
QRS DURATION: 86 MS
QTC CALCULATION (BEZET): 414 MS
QTC CALCULATION (BEZET): 433 MS
R AXIS: 14 DEGREES
R AXIS: 22 DEGREES
RBC # BLD AUTO: 4.99 X10(6)UL
SODIUM SERPL-SCNC: 137 MMOL/L (ref 136–145)
SP GR UR STRIP: >1.03 (ref 1–1.03)
T AXIS: 10 DEGREES
T AXIS: 18 DEGREES
TROPONIN I HIGH SENSITIVITY: 3 NG/L
TROPONIN I HIGH SENSITIVITY: <3 NG/L
UROBILINOGEN UR STRIP-ACNC: NORMAL
VENTRICULAR RATE: 77 BPM
VENTRICULAR RATE: 78 BPM
WBC # BLD AUTO: 9.7 X10(3) UL (ref 4–11)

## 2023-05-16 PROCEDURE — 84484 ASSAY OF TROPONIN QUANT: CPT

## 2023-05-16 PROCEDURE — 81001 URINALYSIS AUTO W/SCOPE: CPT | Performed by: EMERGENCY MEDICINE

## 2023-05-16 PROCEDURE — 80053 COMPREHEN METABOLIC PANEL: CPT | Performed by: EMERGENCY MEDICINE

## 2023-05-16 PROCEDURE — 93005 ELECTROCARDIOGRAM TRACING: CPT

## 2023-05-16 PROCEDURE — 99284 EMERGENCY DEPT VISIT MOD MDM: CPT | Performed by: EMERGENCY MEDICINE

## 2023-05-16 PROCEDURE — 84484 ASSAY OF TROPONIN QUANT: CPT | Performed by: EMERGENCY MEDICINE

## 2023-05-16 PROCEDURE — 93010 ELECTROCARDIOGRAM REPORT: CPT | Performed by: EMERGENCY MEDICINE

## 2023-05-16 PROCEDURE — 85025 COMPLETE CBC W/AUTO DIFF WBC: CPT | Performed by: EMERGENCY MEDICINE

## 2023-05-16 PROCEDURE — 80053 COMPREHEN METABOLIC PANEL: CPT

## 2023-05-16 PROCEDURE — 85379 FIBRIN DEGRADATION QUANT: CPT | Performed by: EMERGENCY MEDICINE

## 2023-05-16 PROCEDURE — 71045 X-RAY EXAM CHEST 1 VIEW: CPT

## 2023-05-16 PROCEDURE — 81025 URINE PREGNANCY TEST: CPT

## 2023-05-16 PROCEDURE — 36415 COLL VENOUS BLD VENIPUNCTURE: CPT | Performed by: EMERGENCY MEDICINE

## 2023-05-16 PROCEDURE — 85025 COMPLETE CBC W/AUTO DIFF WBC: CPT

## 2023-05-16 NOTE — ED INITIAL ASSESSMENT (HPI)
Patient ambulatory to ED with complaint of midsternal chest pain that started at 6 am. Hx of pulmonary HTN. Patient is AXOX4.

## 2023-05-16 NOTE — DISCHARGE INSTRUCTIONS
Follow-up with your primary physician for further evaluation. Return to the emergency department if increasing pain, difficulty breathing, or other new symptoms develop.

## 2023-05-16 NOTE — ED QUICK NOTES
Introduced self to patient. Patient lying in position of comfort in bed, on monitors, blanket provided, given call light and instructed to call if anything was needed.

## 2023-07-09 ENCOUNTER — HOSPITAL ENCOUNTER (EMERGENCY)
Facility: HOSPITAL | Age: 38
Discharge: HOME | End: 2023-07-09
Attending: EMERGENCY MEDICINE
Payer: MEDICAID

## 2023-07-09 ENCOUNTER — APPOINTMENT (OUTPATIENT)
Dept: GENERAL RADIOLOGY | Facility: HOSPITAL | Age: 38
End: 2023-07-09
Attending: EMERGENCY MEDICINE
Payer: MEDICAID

## 2023-07-09 VITALS
HEIGHT: 64 IN | HEART RATE: 75 BPM | RESPIRATION RATE: 15 BRPM | DIASTOLIC BLOOD PRESSURE: 60 MMHG | TEMPERATURE: 97 F | WEIGHT: 270 LBS | OXYGEN SATURATION: 100 % | SYSTOLIC BLOOD PRESSURE: 110 MMHG | BODY MASS INDEX: 46.1 KG/M2

## 2023-07-09 DIAGNOSIS — R07.9 CHEST PAIN OF UNCERTAIN ETIOLOGY: Primary | ICD-10-CM

## 2023-07-09 DIAGNOSIS — R42 DIZZINESS: ICD-10-CM

## 2023-07-09 LAB
ANION GAP SERPL CALC-SCNC: 5 MMOL/L (ref 0–18)
BASOPHILS # BLD AUTO: 0.03 X10(3) UL (ref 0–0.2)
BASOPHILS NFR BLD AUTO: 0.3 %
BUN BLD-MCNC: 12 MG/DL (ref 7–18)
BUN/CREAT SERPL: 12.5 (ref 10–20)
CALCIUM BLD-MCNC: 8.9 MG/DL (ref 8.5–10.1)
CHLORIDE SERPL-SCNC: 108 MMOL/L (ref 98–112)
CO2 SERPL-SCNC: 27 MMOL/L (ref 21–32)
CREAT BLD-MCNC: 0.96 MG/DL
DEPRECATED RDW RBC AUTO: 40.6 FL (ref 35.1–46.3)
EOSINOPHIL # BLD AUTO: 0.17 X10(3) UL (ref 0–0.7)
EOSINOPHIL NFR BLD AUTO: 1.6 %
ERYTHROCYTE [DISTWIDTH] IN BLOOD BY AUTOMATED COUNT: 12.5 % (ref 11–15)
GFR SERPLBLD BASED ON 1.73 SQ M-ARVRAT: 79 ML/MIN/1.73M2 (ref 60–?)
GLUCOSE BLD-MCNC: 92 MG/DL (ref 70–99)
HCT VFR BLD AUTO: 42.6 %
HGB BLD-MCNC: 14.2 G/DL
IMM GRANULOCYTES # BLD AUTO: 0.03 X10(3) UL (ref 0–1)
IMM GRANULOCYTES NFR BLD: 0.3 %
LYMPHOCYTES # BLD AUTO: 3.28 X10(3) UL (ref 1–4)
LYMPHOCYTES NFR BLD AUTO: 30.4 %
MCH RBC QN AUTO: 29.8 PG (ref 26–34)
MCHC RBC AUTO-ENTMCNC: 33.3 G/DL (ref 31–37)
MCV RBC AUTO: 89.3 FL
MONOCYTES # BLD AUTO: 0.77 X10(3) UL (ref 0.1–1)
MONOCYTES NFR BLD AUTO: 7.1 %
NEUTROPHILS # BLD AUTO: 6.51 X10 (3) UL (ref 1.5–7.7)
NEUTROPHILS # BLD AUTO: 6.51 X10(3) UL (ref 1.5–7.7)
NEUTROPHILS NFR BLD AUTO: 60.3 %
OSMOLALITY SERPL CALC.SUM OF ELEC: 289 MOSM/KG (ref 275–295)
PLATELET # BLD AUTO: 321 10(3)UL (ref 150–450)
POTASSIUM SERPL-SCNC: 4.4 MMOL/L (ref 3.5–5.1)
RBC # BLD AUTO: 4.77 X10(6)UL
SODIUM SERPL-SCNC: 140 MMOL/L (ref 136–145)
TROPONIN I HIGH SENSITIVITY: 3 NG/L
WBC # BLD AUTO: 10.8 X10(3) UL (ref 4–11)

## 2023-07-09 PROCEDURE — 99284 EMERGENCY DEPT VISIT MOD MDM: CPT

## 2023-07-09 PROCEDURE — 84484 ASSAY OF TROPONIN QUANT: CPT | Performed by: EMERGENCY MEDICINE

## 2023-07-09 PROCEDURE — 93010 ELECTROCARDIOGRAM REPORT: CPT

## 2023-07-09 PROCEDURE — 80048 BASIC METABOLIC PNL TOTAL CA: CPT | Performed by: EMERGENCY MEDICINE

## 2023-07-09 PROCEDURE — 71045 X-RAY EXAM CHEST 1 VIEW: CPT | Performed by: EMERGENCY MEDICINE

## 2023-07-09 PROCEDURE — 36415 COLL VENOUS BLD VENIPUNCTURE: CPT

## 2023-07-09 PROCEDURE — 85025 COMPLETE CBC W/AUTO DIFF WBC: CPT | Performed by: EMERGENCY MEDICINE

## 2023-07-09 PROCEDURE — 99285 EMERGENCY DEPT VISIT HI MDM: CPT

## 2023-07-09 PROCEDURE — 93005 ELECTROCARDIOGRAM TRACING: CPT

## 2023-07-10 LAB
ATRIAL RATE: 86 BPM
P AXIS: 46 DEGREES
P-R INTERVAL: 146 MS
Q-T INTERVAL: 370 MS
QRS DURATION: 80 MS
QTC CALCULATION (BEZET): 442 MS
R AXIS: 4 DEGREES
T AXIS: 7 DEGREES
VENTRICULAR RATE: 86 BPM

## 2023-08-13 ENCOUNTER — APPOINTMENT (OUTPATIENT)
Dept: GENERAL RADIOLOGY | Facility: HOSPITAL | Age: 38
End: 2023-08-13
Attending: EMERGENCY MEDICINE
Payer: MEDICAID

## 2023-08-13 ENCOUNTER — HOSPITAL ENCOUNTER (EMERGENCY)
Facility: HOSPITAL | Age: 38
Discharge: HOME | End: 2023-08-13
Attending: EMERGENCY MEDICINE
Payer: MEDICAID

## 2023-08-13 ENCOUNTER — APPOINTMENT (OUTPATIENT)
Dept: CT IMAGING | Facility: HOSPITAL | Age: 38
End: 2023-08-13
Attending: EMERGENCY MEDICINE
Payer: MEDICAID

## 2023-08-13 VITALS
RESPIRATION RATE: 14 BRPM | SYSTOLIC BLOOD PRESSURE: 109 MMHG | WEIGHT: 275 LBS | DIASTOLIC BLOOD PRESSURE: 62 MMHG | BODY MASS INDEX: 46.38 KG/M2 | TEMPERATURE: 98 F | OXYGEN SATURATION: 100 % | HEART RATE: 69 BPM | HEIGHT: 64.5 IN

## 2023-08-13 DIAGNOSIS — R07.89 CHEST PAIN, ATYPICAL: Primary | ICD-10-CM

## 2023-08-13 LAB
ALBUMIN SERPL-MCNC: 3.8 G/DL (ref 3.4–5)
ALBUMIN/GLOB SERPL: 0.8 {RATIO} (ref 1–2)
ALP LIVER SERPL-CCNC: 84 U/L
ALT SERPL-CCNC: 33 U/L
ANION GAP SERPL CALC-SCNC: 3 MMOL/L (ref 0–18)
AST SERPL-CCNC: 23 U/L (ref 15–37)
ATRIAL RATE: 75 BPM
B-HCG UR QL: NEGATIVE
BASOPHILS # BLD AUTO: 0.04 X10(3) UL (ref 0–0.2)
BASOPHILS NFR BLD AUTO: 0.4 %
BILIRUB SERPL-MCNC: 0.6 MG/DL (ref 0.1–2)
BUN BLD-MCNC: 8 MG/DL (ref 7–18)
BUN/CREAT SERPL: 9.1 (ref 10–20)
CALCIUM BLD-MCNC: 9.3 MG/DL (ref 8.5–10.1)
CHLORIDE SERPL-SCNC: 109 MMOL/L (ref 98–112)
CO2 SERPL-SCNC: 27 MMOL/L (ref 21–32)
CREAT BLD-MCNC: 0.88 MG/DL
D DIMER PPP FEU-MCNC: 0.87 UG/ML FEU (ref ?–0.5)
DEPRECATED RDW RBC AUTO: 38.5 FL (ref 35.1–46.3)
EGFRCR SERPLBLD CKD-EPI 2021: 87 ML/MIN/1.73M2 (ref 60–?)
EOSINOPHIL # BLD AUTO: 0.15 X10(3) UL (ref 0–0.7)
EOSINOPHIL NFR BLD AUTO: 1.4 %
ERYTHROCYTE [DISTWIDTH] IN BLOOD BY AUTOMATED COUNT: 11.9 % (ref 11–15)
GLOBULIN PLAS-MCNC: 4.6 G/DL (ref 2.8–4.4)
GLUCOSE BLD-MCNC: 95 MG/DL (ref 70–99)
HCT VFR BLD AUTO: 43.8 %
HGB BLD-MCNC: 14.6 G/DL
IMM GRANULOCYTES # BLD AUTO: 0.06 X10(3) UL (ref 0–1)
IMM GRANULOCYTES NFR BLD: 0.5 %
LYMPHOCYTES # BLD AUTO: 3.42 X10(3) UL (ref 1–4)
LYMPHOCYTES NFR BLD AUTO: 31 %
MCH RBC QN AUTO: 29.2 PG (ref 26–34)
MCHC RBC AUTO-ENTMCNC: 33.3 G/DL (ref 31–37)
MCV RBC AUTO: 87.6 FL
MONOCYTES # BLD AUTO: 0.85 X10(3) UL (ref 0.1–1)
MONOCYTES NFR BLD AUTO: 7.7 %
NEUTROPHILS # BLD AUTO: 6.52 X10 (3) UL (ref 1.5–7.7)
NEUTROPHILS # BLD AUTO: 6.52 X10(3) UL (ref 1.5–7.7)
NEUTROPHILS NFR BLD AUTO: 59 %
OSMOLALITY SERPL CALC.SUM OF ELEC: 286 MOSM/KG (ref 275–295)
P AXIS: 33 DEGREES
P-R INTERVAL: 142 MS
PLATELET # BLD AUTO: 349 10(3)UL (ref 150–450)
POTASSIUM SERPL-SCNC: 3.9 MMOL/L (ref 3.5–5.1)
PROT SERPL-MCNC: 8.4 G/DL (ref 6.4–8.2)
Q-T INTERVAL: 370 MS
QRS DURATION: 82 MS
QTC CALCULATION (BEZET): 413 MS
R AXIS: 3 DEGREES
RBC # BLD AUTO: 5 X10(6)UL
SODIUM SERPL-SCNC: 139 MMOL/L (ref 136–145)
T AXIS: 10 DEGREES
TROPONIN I HIGH SENSITIVITY: 4 NG/L
VENTRICULAR RATE: 75 BPM
WBC # BLD AUTO: 11 X10(3) UL (ref 4–11)

## 2023-08-13 PROCEDURE — 71260 CT THORAX DX C+: CPT | Performed by: EMERGENCY MEDICINE

## 2023-08-13 PROCEDURE — 99285 EMERGENCY DEPT VISIT HI MDM: CPT

## 2023-08-13 PROCEDURE — 84484 ASSAY OF TROPONIN QUANT: CPT | Performed by: EMERGENCY MEDICINE

## 2023-08-13 PROCEDURE — 85379 FIBRIN DEGRADATION QUANT: CPT | Performed by: EMERGENCY MEDICINE

## 2023-08-13 PROCEDURE — 93010 ELECTROCARDIOGRAM REPORT: CPT

## 2023-08-13 PROCEDURE — 93005 ELECTROCARDIOGRAM TRACING: CPT

## 2023-08-13 PROCEDURE — 85025 COMPLETE CBC W/AUTO DIFF WBC: CPT | Performed by: EMERGENCY MEDICINE

## 2023-08-13 PROCEDURE — 80053 COMPREHEN METABOLIC PANEL: CPT | Performed by: EMERGENCY MEDICINE

## 2023-08-13 PROCEDURE — 36415 COLL VENOUS BLD VENIPUNCTURE: CPT

## 2023-08-13 PROCEDURE — 71045 X-RAY EXAM CHEST 1 VIEW: CPT | Performed by: EMERGENCY MEDICINE

## 2023-08-13 PROCEDURE — 81025 URINE PREGNANCY TEST: CPT

## 2023-08-13 NOTE — ED QUICK NOTES
Elizabeth Vance arrived through triage with her Mom for c/o chest discomfort and shortness of breath. She reports progressively worsening dyspnea on exertion since yesterday evening. States she was sitting in [employer] this morning when she began to experience L sided chest discomfort radiating into L shoulder. Pain resolved without intervention and she has no c/o active chest pain at this time. Acute on chronic BLE edema.

## 2023-08-13 NOTE — ED QUICK NOTES
Pt winded upon walking to and from the restroom. She does take oral Lasix. External purewick catheter placed for comfort. Notably orthopneic w/ placement of purewick.

## 2023-08-13 NOTE — DISCHARGE INSTRUCTIONS
Return for any worsening or concern    Follow up with your physician or the one provided as instructed. If you are having difficulty getting an appointment with physician, please notify the ED Case Manager at [phone].     Take medicines as prescribed

## 2023-08-13 NOTE — ED QUICK NOTES
Discharge instructions reviewed with Tereso Lombard who verbalizes understanding. Encouraged to return to ED for any new or worsening symptoms. To follow-up with primary care physician this week.

## 2023-08-13 NOTE — ED INITIAL ASSESSMENT (HPI)
Pt to ED for left sided chest pain radiating to neck and shoulder for approx. 30 min. C/O sally.    Hx pulmonary HTN

## 2023-09-07 ENCOUNTER — HOSPITAL ENCOUNTER (EMERGENCY)
Facility: HOSPITAL | Age: 38
Discharge: HOME | End: 2023-09-07
Attending: EMERGENCY MEDICINE
Payer: MEDICAID

## 2023-09-07 ENCOUNTER — APPOINTMENT (OUTPATIENT)
Dept: GENERAL RADIOLOGY | Facility: HOSPITAL | Age: 38
End: 2023-09-07
Attending: EMERGENCY MEDICINE
Payer: MEDICAID

## 2023-09-07 VITALS
TEMPERATURE: 97 F | HEART RATE: 69 BPM | DIASTOLIC BLOOD PRESSURE: 98 MMHG | OXYGEN SATURATION: 99 % | RESPIRATION RATE: 20 BRPM | SYSTOLIC BLOOD PRESSURE: 135 MMHG

## 2023-09-07 DIAGNOSIS — R06.02 SHORTNESS OF BREATH: Primary | ICD-10-CM

## 2023-09-07 LAB
ALBUMIN SERPL-MCNC: 3.5 G/DL (ref 3.4–5)
ALBUMIN/GLOB SERPL: 0.8 {RATIO} (ref 1–2)
ALP LIVER SERPL-CCNC: 85 U/L
ALT SERPL-CCNC: 29 U/L
ANION GAP SERPL CALC-SCNC: 4 MMOL/L (ref 0–18)
AST SERPL-CCNC: 18 U/L (ref 15–37)
BASOPHILS # BLD AUTO: 0.05 X10(3) UL (ref 0–0.2)
BASOPHILS NFR BLD AUTO: 0.4 %
BILIRUB SERPL-MCNC: 0.4 MG/DL (ref 0.1–2)
BUN BLD-MCNC: 9 MG/DL (ref 7–18)
BUN/CREAT SERPL: 12 (ref 10–20)
CALCIUM BLD-MCNC: 8.7 MG/DL (ref 8.5–10.1)
CHLORIDE SERPL-SCNC: 109 MMOL/L (ref 98–112)
CO2 SERPL-SCNC: 27 MMOL/L (ref 21–32)
CREAT BLD-MCNC: 0.75 MG/DL
DEPRECATED RDW RBC AUTO: 38.4 FL (ref 35.1–46.3)
EGFRCR SERPLBLD CKD-EPI 2021: 106 ML/MIN/1.73M2 (ref 60–?)
EOSINOPHIL # BLD AUTO: 0.23 X10(3) UL (ref 0–0.7)
EOSINOPHIL NFR BLD AUTO: 1.7 %
ERYTHROCYTE [DISTWIDTH] IN BLOOD BY AUTOMATED COUNT: 11.9 % (ref 11–15)
GLOBULIN PLAS-MCNC: 4.3 G/DL (ref 2.8–4.4)
GLUCOSE BLD-MCNC: 95 MG/DL (ref 70–99)
HCT VFR BLD AUTO: 41.1 %
HGB BLD-MCNC: 13.8 G/DL
IMM GRANULOCYTES # BLD AUTO: 0.08 X10(3) UL (ref 0–1)
IMM GRANULOCYTES NFR BLD: 0.6 %
LYMPHOCYTES # BLD AUTO: 3.97 X10(3) UL (ref 1–4)
LYMPHOCYTES NFR BLD AUTO: 29.4 %
MCH RBC QN AUTO: 29.5 PG (ref 26–34)
MCHC RBC AUTO-ENTMCNC: 33.6 G/DL (ref 31–37)
MCV RBC AUTO: 87.8 FL
MONOCYTES # BLD AUTO: 0.93 X10(3) UL (ref 0.1–1)
MONOCYTES NFR BLD AUTO: 6.9 %
NEUTROPHILS # BLD AUTO: 8.26 X10 (3) UL (ref 1.5–7.7)
NEUTROPHILS # BLD AUTO: 8.26 X10(3) UL (ref 1.5–7.7)
NEUTROPHILS NFR BLD AUTO: 61 %
NT-PROBNP SERPL-MCNC: 182 PG/ML (ref ?–125)
OSMOLALITY SERPL CALC.SUM OF ELEC: 288 MOSM/KG (ref 275–295)
PLATELET # BLD AUTO: 328 10(3)UL (ref 150–450)
POTASSIUM SERPL-SCNC: 4 MMOL/L (ref 3.5–5.1)
PROT SERPL-MCNC: 7.8 G/DL (ref 6.4–8.2)
RBC # BLD AUTO: 4.68 X10(6)UL
SODIUM SERPL-SCNC: 140 MMOL/L (ref 136–145)
TROPONIN I HIGH SENSITIVITY: 5 NG/L
WBC # BLD AUTO: 13.5 X10(3) UL (ref 4–11)

## 2023-09-07 PROCEDURE — 96374 THER/PROPH/DIAG INJ IV PUSH: CPT

## 2023-09-07 PROCEDURE — 83880 ASSAY OF NATRIURETIC PEPTIDE: CPT | Performed by: EMERGENCY MEDICINE

## 2023-09-07 PROCEDURE — 93005 ELECTROCARDIOGRAM TRACING: CPT

## 2023-09-07 PROCEDURE — 80053 COMPREHEN METABOLIC PANEL: CPT | Performed by: EMERGENCY MEDICINE

## 2023-09-07 PROCEDURE — 99285 EMERGENCY DEPT VISIT HI MDM: CPT

## 2023-09-07 PROCEDURE — 94640 AIRWAY INHALATION TREATMENT: CPT

## 2023-09-07 PROCEDURE — 71045 X-RAY EXAM CHEST 1 VIEW: CPT | Performed by: EMERGENCY MEDICINE

## 2023-09-07 PROCEDURE — 99284 EMERGENCY DEPT VISIT MOD MDM: CPT

## 2023-09-07 PROCEDURE — 93010 ELECTROCARDIOGRAM REPORT: CPT

## 2023-09-07 PROCEDURE — 84484 ASSAY OF TROPONIN QUANT: CPT | Performed by: EMERGENCY MEDICINE

## 2023-09-07 PROCEDURE — 85025 COMPLETE CBC W/AUTO DIFF WBC: CPT | Performed by: EMERGENCY MEDICINE

## 2023-09-07 RX ORDER — ALBUTEROL SULFATE 2.5 MG/3ML
2.5 SOLUTION RESPIRATORY (INHALATION) ONCE
Status: COMPLETED | OUTPATIENT
Start: 2023-09-07 | End: 2023-09-07

## 2023-09-07 RX ORDER — PREDNISONE 20 MG/1
40 TABLET ORAL ONCE
Status: COMPLETED | OUTPATIENT
Start: 2023-09-07 | End: 2023-09-07

## 2023-09-07 RX ORDER — ALBUTEROL SULFATE 90 UG/1
2 AEROSOL, METERED RESPIRATORY (INHALATION) 4 TIMES DAILY
Status: DISCONTINUED | OUTPATIENT
Start: 2023-09-07 | End: 2023-09-07

## 2023-09-07 RX ORDER — FUROSEMIDE 10 MG/ML
40 INJECTION INTRAMUSCULAR; INTRAVENOUS ONCE
Status: COMPLETED | OUTPATIENT
Start: 2023-09-07 | End: 2023-09-07

## 2023-09-07 RX ORDER — PREDNISONE 20 MG/1
40 TABLET ORAL DAILY
Qty: 10 TABLET | Refills: 0 | Status: SHIPPED | OUTPATIENT
Start: 2023-09-07 | End: 2023-09-12

## 2023-09-07 NOTE — ED INITIAL ASSESSMENT (HPI)
Patient ambulatory to triage, c/o intermittent chest pain, shortness of breath and increased leg swelling over the last few months. Has seen her doctor and had multiple adjustments to her medications. Is supposed to get an echo to evaluate for heart failure but unable to make a sooner appointment.  Shortness of breath got worse today and was advised to come to ED due to hx of pulmonary hypertension

## 2023-09-07 NOTE — ED INITIAL ASSESSMENT (HPI)
Patient reports slipping down 6 steps at home on Tuesday, reporting lower back pain and L foot pain. Denies hitting head, denies numbness or tingling. Ambulatory through triage. Hx of bulging disk in back, reports taking tylenol and ibuprofen with minimal relief.

## 2023-09-08 NOTE — DISCHARGE INSTRUCTIONS
Start taking the steroid and use your albuterol every 4 hours. Continue taking all your heart medications and be in contact with your specialists. Return to the ER if your shortness of breath worsens.

## 2023-09-09 LAB
ATRIAL RATE: 68 BPM
P AXIS: 38 DEGREES
P-R INTERVAL: 148 MS
Q-T INTERVAL: 386 MS
QRS DURATION: 86 MS
QTC CALCULATION (BEZET): 410 MS
R AXIS: 33 DEGREES
T AXIS: 17 DEGREES
VENTRICULAR RATE: 68 BPM

## 2023-10-15 ENCOUNTER — HOSPITAL ENCOUNTER (EMERGENCY)
Facility: HOSPITAL | Age: 38
Discharge: HOME | End: 2023-10-15
Attending: EMERGENCY MEDICINE
Payer: MEDICAID

## 2023-10-15 ENCOUNTER — APPOINTMENT (OUTPATIENT)
Dept: GENERAL RADIOLOGY | Facility: HOSPITAL | Age: 38
End: 2023-10-15
Payer: MEDICAID

## 2023-10-15 ENCOUNTER — APPOINTMENT (OUTPATIENT)
Dept: ULTRASOUND IMAGING | Facility: HOSPITAL | Age: 38
End: 2023-10-15
Attending: EMERGENCY MEDICINE
Payer: MEDICAID

## 2023-10-15 VITALS
HEIGHT: 65 IN | WEIGHT: 275.81 LBS | OXYGEN SATURATION: 100 % | TEMPERATURE: 98 F | RESPIRATION RATE: 16 BRPM | SYSTOLIC BLOOD PRESSURE: 126 MMHG | BODY MASS INDEX: 45.95 KG/M2 | HEART RATE: 80 BPM | DIASTOLIC BLOOD PRESSURE: 64 MMHG

## 2023-10-15 DIAGNOSIS — I89.0 LYMPHEDEMA: Primary | ICD-10-CM

## 2023-10-15 LAB
ALBUMIN SERPL-MCNC: 3.4 G/DL (ref 3.4–5)
ALBUMIN/GLOB SERPL: 0.8 {RATIO} (ref 1–2)
ALP LIVER SERPL-CCNC: 80 U/L
ALT SERPL-CCNC: 47 U/L
ANION GAP SERPL CALC-SCNC: 5 MMOL/L (ref 0–18)
AST SERPL-CCNC: 29 U/L (ref 15–37)
ATRIAL RATE: 69 BPM
BASOPHILS # BLD AUTO: 0.03 X10(3) UL (ref 0–0.2)
BASOPHILS NFR BLD AUTO: 0.3 %
BILIRUB SERPL-MCNC: 1 MG/DL (ref 0.1–2)
BUN BLD-MCNC: 12 MG/DL (ref 7–18)
BUN/CREAT SERPL: 13.5 (ref 10–20)
CALCIUM BLD-MCNC: 8.8 MG/DL (ref 8.5–10.1)
CHLORIDE SERPL-SCNC: 110 MMOL/L (ref 98–112)
CO2 SERPL-SCNC: 28 MMOL/L (ref 21–32)
CREAT BLD-MCNC: 0.89 MG/DL
DEPRECATED RDW RBC AUTO: 37.5 FL (ref 35.1–46.3)
EGFRCR SERPLBLD CKD-EPI 2021: 86 ML/MIN/1.73M2 (ref 60–?)
EOSINOPHIL # BLD AUTO: 0.17 X10(3) UL (ref 0–0.7)
EOSINOPHIL NFR BLD AUTO: 1.7 %
ERYTHROCYTE [DISTWIDTH] IN BLOOD BY AUTOMATED COUNT: 11.7 % (ref 11–15)
GLOBULIN PLAS-MCNC: 4.2 G/DL (ref 2.8–4.4)
GLUCOSE BLD-MCNC: 91 MG/DL (ref 70–99)
HCT VFR BLD AUTO: 39.6 %
HGB BLD-MCNC: 13.3 G/DL
IMM GRANULOCYTES # BLD AUTO: 0.04 X10(3) UL (ref 0–1)
IMM GRANULOCYTES NFR BLD: 0.4 %
LYMPHOCYTES # BLD AUTO: 3.17 X10(3) UL (ref 1–4)
LYMPHOCYTES NFR BLD AUTO: 31.3 %
MAGNESIUM SERPL-MCNC: 1.8 MG/DL (ref 1.6–2.6)
MCH RBC QN AUTO: 29.2 PG (ref 26–34)
MCHC RBC AUTO-ENTMCNC: 33.6 G/DL (ref 31–37)
MCV RBC AUTO: 87 FL
MONOCYTES # BLD AUTO: 0.59 X10(3) UL (ref 0.1–1)
MONOCYTES NFR BLD AUTO: 5.8 %
NEUTROPHILS # BLD AUTO: 6.14 X10 (3) UL (ref 1.5–7.7)
NEUTROPHILS # BLD AUTO: 6.14 X10(3) UL (ref 1.5–7.7)
NEUTROPHILS NFR BLD AUTO: 60.5 %
NT-PROBNP SERPL-MCNC: 80 PG/ML (ref ?–125)
OSMOLALITY SERPL CALC.SUM OF ELEC: 295 MOSM/KG (ref 275–295)
P AXIS: 17 DEGREES
P-R INTERVAL: 144 MS
PLATELET # BLD AUTO: 291 10(3)UL (ref 150–450)
POTASSIUM SERPL-SCNC: 3.6 MMOL/L (ref 3.5–5.1)
PROT SERPL-MCNC: 7.6 G/DL (ref 6.4–8.2)
Q-T INTERVAL: 396 MS
QRS DURATION: 82 MS
QTC CALCULATION (BEZET): 424 MS
R AXIS: 6 DEGREES
RBC # BLD AUTO: 4.55 X10(6)UL
SODIUM SERPL-SCNC: 143 MMOL/L (ref 136–145)
T AXIS: -2 DEGREES
VENTRICULAR RATE: 69 BPM
WBC # BLD AUTO: 10.1 X10(3) UL (ref 4–11)

## 2023-10-15 PROCEDURE — 85025 COMPLETE CBC W/AUTO DIFF WBC: CPT | Performed by: EMERGENCY MEDICINE

## 2023-10-15 PROCEDURE — 93010 ELECTROCARDIOGRAM REPORT: CPT

## 2023-10-15 PROCEDURE — 93005 ELECTROCARDIOGRAM TRACING: CPT

## 2023-10-15 PROCEDURE — 93971 EXTREMITY STUDY: CPT | Performed by: EMERGENCY MEDICINE

## 2023-10-15 PROCEDURE — 99285 EMERGENCY DEPT VISIT HI MDM: CPT

## 2023-10-15 PROCEDURE — 80053 COMPREHEN METABOLIC PANEL: CPT

## 2023-10-15 PROCEDURE — 85025 COMPLETE CBC W/AUTO DIFF WBC: CPT

## 2023-10-15 PROCEDURE — 83735 ASSAY OF MAGNESIUM: CPT | Performed by: EMERGENCY MEDICINE

## 2023-10-15 PROCEDURE — 80053 COMPREHEN METABOLIC PANEL: CPT | Performed by: EMERGENCY MEDICINE

## 2023-10-15 PROCEDURE — 83880 ASSAY OF NATRIURETIC PEPTIDE: CPT | Performed by: EMERGENCY MEDICINE

## 2023-10-15 PROCEDURE — 36415 COLL VENOUS BLD VENIPUNCTURE: CPT

## 2023-10-15 PROCEDURE — 71045 X-RAY EXAM CHEST 1 VIEW: CPT | Performed by: EMERGENCY MEDICINE

## 2023-10-15 NOTE — ED INITIAL ASSESSMENT (HPI)
Patient arrives ambulatory through triage with complaints of LLE swelling, SOB, facial swelling. Recent dx CHF, compliant with Bumex 2mg BID.

## 2024-01-30 ENCOUNTER — APPOINTMENT (OUTPATIENT)
Dept: GENERAL RADIOLOGY | Facility: HOSPITAL | Age: 39
End: 2024-01-30
Attending: NURSE PRACTITIONER
Payer: MEDICAID

## 2024-01-30 ENCOUNTER — HOSPITAL ENCOUNTER (EMERGENCY)
Facility: HOSPITAL | Age: 39
Discharge: HOME | End: 2024-01-30
Payer: MEDICAID

## 2024-01-30 VITALS
TEMPERATURE: 98 F | DIASTOLIC BLOOD PRESSURE: 73 MMHG | HEIGHT: 64 IN | WEIGHT: 276 LBS | HEART RATE: 81 BPM | RESPIRATION RATE: 18 BRPM | OXYGEN SATURATION: 99 % | SYSTOLIC BLOOD PRESSURE: 119 MMHG | BODY MASS INDEX: 47.12 KG/M2

## 2024-01-30 DIAGNOSIS — R07.9 ACUTE CHEST PAIN: Primary | ICD-10-CM

## 2024-01-30 LAB
ANION GAP SERPL CALC-SCNC: 4 MMOL/L (ref 0–18)
BASOPHILS # BLD AUTO: 0.06 X10(3) UL (ref 0–0.2)
BASOPHILS NFR BLD AUTO: 0.5 %
BNP SERPL-MCNC: 29 PG/ML
BUN BLD-MCNC: 16 MG/DL (ref 9–23)
BUN/CREAT SERPL: 22.2 (ref 10–20)
CALCIUM BLD-MCNC: 9.1 MG/DL (ref 8.7–10.4)
CHLORIDE SERPL-SCNC: 104 MMOL/L (ref 98–112)
CO2 SERPL-SCNC: 29 MMOL/L (ref 21–32)
CREAT BLD-MCNC: 0.72 MG/DL
DEPRECATED RDW RBC AUTO: 39.1 FL (ref 35.1–46.3)
EGFRCR SERPLBLD CKD-EPI 2021: 111 ML/MIN/1.73M2 (ref 60–?)
EOSINOPHIL # BLD AUTO: 0.15 X10(3) UL (ref 0–0.7)
EOSINOPHIL NFR BLD AUTO: 1.2 %
ERYTHROCYTE [DISTWIDTH] IN BLOOD BY AUTOMATED COUNT: 12 % (ref 11–15)
GLUCOSE BLD-MCNC: 83 MG/DL (ref 70–99)
HCT VFR BLD AUTO: 42.3 %
HGB BLD-MCNC: 13.9 G/DL
IMM GRANULOCYTES # BLD AUTO: 0.08 X10(3) UL (ref 0–1)
IMM GRANULOCYTES NFR BLD: 0.6 %
LYMPHOCYTES # BLD AUTO: 4.29 X10(3) UL (ref 1–4)
LYMPHOCYTES NFR BLD AUTO: 33.9 %
MCH RBC QN AUTO: 28.7 PG (ref 26–34)
MCHC RBC AUTO-ENTMCNC: 32.9 G/DL (ref 31–37)
MCV RBC AUTO: 87.4 FL
MONOCYTES # BLD AUTO: 1.16 X10(3) UL (ref 0.1–1)
MONOCYTES NFR BLD AUTO: 9.2 %
NEUTROPHILS # BLD AUTO: 6.92 X10 (3) UL (ref 1.5–7.7)
NEUTROPHILS # BLD AUTO: 6.92 X10(3) UL (ref 1.5–7.7)
NEUTROPHILS NFR BLD AUTO: 54.6 %
OSMOLALITY SERPL CALC.SUM OF ELEC: 284 MOSM/KG (ref 275–295)
PLATELET # BLD AUTO: 309 10(3)UL (ref 150–450)
POTASSIUM SERPL-SCNC: 3.8 MMOL/L (ref 3.5–5.1)
RBC # BLD AUTO: 4.84 X10(6)UL
SODIUM SERPL-SCNC: 137 MMOL/L (ref 136–145)
TROPONIN I SERPL HS-MCNC: <3 NG/L
WBC # BLD AUTO: 12.7 X10(3) UL (ref 4–11)

## 2024-01-30 PROCEDURE — 93010 ELECTROCARDIOGRAM REPORT: CPT

## 2024-01-30 PROCEDURE — 85025 COMPLETE CBC W/AUTO DIFF WBC: CPT | Performed by: NURSE PRACTITIONER

## 2024-01-30 PROCEDURE — 71045 X-RAY EXAM CHEST 1 VIEW: CPT | Performed by: NURSE PRACTITIONER

## 2024-01-30 PROCEDURE — 99284 EMERGENCY DEPT VISIT MOD MDM: CPT

## 2024-01-30 PROCEDURE — 80048 BASIC METABOLIC PNL TOTAL CA: CPT | Performed by: NURSE PRACTITIONER

## 2024-01-30 PROCEDURE — 93005 ELECTROCARDIOGRAM TRACING: CPT

## 2024-01-30 PROCEDURE — 84484 ASSAY OF TROPONIN QUANT: CPT | Performed by: NURSE PRACTITIONER

## 2024-01-30 PROCEDURE — 83880 ASSAY OF NATRIURETIC PEPTIDE: CPT | Performed by: NURSE PRACTITIONER

## 2024-01-30 PROCEDURE — 36415 COLL VENOUS BLD VENIPUNCTURE: CPT

## 2024-01-30 RX ORDER — METHYLPREDNISOLONE 4 MG/1
4 TABLET ORAL DAILY
COMMUNITY

## 2024-01-30 RX ORDER — CETIRIZINE HYDROCHLORIDE 10 MG/1
10 TABLET ORAL DAILY
COMMUNITY

## 2024-01-30 RX ORDER — BUDESONIDE AND FORMOTEROL FUMARATE DIHYDRATE 160; 4.5 UG/1; UG/1
2 AEROSOL RESPIRATORY (INHALATION) 2 TIMES DAILY
COMMUNITY

## 2024-01-30 RX ORDER — MELATONIN
1000 DAILY
COMMUNITY

## 2024-01-30 RX ORDER — COLCHICINE 0.6 MG/1
0.6 TABLET ORAL DAILY
COMMUNITY

## 2024-01-30 RX ORDER — ACETAMINOPHEN AND CODEINE PHOSPHATE 300; 30 MG/1; MG/1
1 TABLET ORAL EVERY 4 HOURS PRN
COMMUNITY

## 2024-01-30 RX ORDER — MAGNESIUM OXIDE 400 MG/1
400 TABLET ORAL DAILY
COMMUNITY

## 2024-01-30 RX ORDER — HYDROCHLOROTHIAZIDE 12.5 MG/1
12.5 TABLET ORAL DAILY
COMMUNITY

## 2024-01-30 RX ORDER — PANTOPRAZOLE SODIUM 40 MG/1
40 TABLET, DELAYED RELEASE ORAL
COMMUNITY

## 2024-01-30 RX ORDER — DICLOFENAC SODIUM 75 MG/1
75 TABLET, DELAYED RELEASE ORAL 2 TIMES DAILY
COMMUNITY

## 2024-01-30 NOTE — ED INITIAL ASSESSMENT (HPI)
Pt presents to ED for chest pain that started this morning. Hx of CHF. Pt was sent to ED by her PCP for a chest x ray and cardiac work up.

## 2024-01-30 NOTE — ED PROVIDER NOTES
Patient Seen in: [facility] Emergency Department      History     Chief Complaint   Patient presents with    Chest Pain Angina     Stated Complaint: CHF    Subjective:   37yo/f w hx of asthma, right sided CHF and pHTN managed by Dr. Roper at [place] reports to the ED w c/o proximal anterior chest pain. Reports this issue has been going on/off for years. On hydrochlorothiazide per Dr. Roper and reports a stable weight recently. NO fever. No back pain. No cough. No hemoptysis. No hx of VTE. Last cath in 2023. No hx of CAD. No weakness or focal deficits. Called Dr. Miner at [facility] IM clinic today and was advised ED eval.             Objective:   Past Medical History:   Diagnosis Date    Asthma     Congestive heart disease (HCC)     Lymph edema     Pulmonary hypertension (HCC)               Past Surgical History:   Procedure Laterality Date      2013    LEG/ANKLE SURGERY PROC UNLISTED Right     achilles tendon sx                Social History     Socioeconomic History    Marital status: Single   Tobacco Use    Smoking status: Never    Smokeless tobacco: Never   Vaping Use    Vaping Use: Never used   Substance and Sexual Activity    Alcohol use: Not Currently    Drug use: Not Currently              Review of Systems   All other systems reviewed and are negative.      Positive for stated complaint: CHF  Other systems are as noted in HPI.  Constitutional and vital signs reviewed.      All other systems reviewed and negative except as noted above.    Physical Exam     ED Triage Vitals [24 1525]   /75   Pulse 88   Resp 18   Temp 97.7 °F (36.5 °C)   Temp src Temporal   SpO2 98 %   O2 Device None (Room air)       Current:/73   Pulse 81   Temp 97.7 °F (36.5 °C) (Temporal)   Resp 18   Ht 162.6 cm (5' 4\")   Wt 125.2 kg   SpO2 99%   BMI 47.38 kg/m²         Physical Exam  Vitals and nursing note reviewed.   Constitutional:       General: She is not in acute distress.      Appearance: She is well-developed.   HENT:      Head: Normocephalic and atraumatic.      Nose: Nose normal.      Mouth/Throat:      Mouth: Mucous membranes are moist.   Eyes:      Conjunctiva/sclera: Conjunctivae normal.      Pupils: Pupils are equal, round, and reactive to light.   Cardiovascular:      Rate and Rhythm: Normal rate and regular rhythm.      Heart sounds: Normal heart sounds.   Pulmonary:      Effort: Pulmonary effort is normal.      Breath sounds: Normal breath sounds.   Abdominal:      General: Bowel sounds are normal.      Palpations: Abdomen is soft.   Musculoskeletal:         General: No tenderness or deformity. Normal range of motion.      Cervical back: Normal range of motion and neck supple.   Skin:     General: Skin is warm and dry.      Capillary Refill: Capillary refill takes less than 2 seconds.      Findings: No rash.      Comments: Normal color   Neurological:      General: No focal deficit present.      Mental Status: She is alert and oriented to person, place, and time.      GCS: GCS eye subscore is 4. GCS verbal subscore is 5. GCS motor subscore is 6.      Cranial Nerves: No cranial nerve deficit.      Gait: Gait normal.           ED Course     Labs Reviewed   BASIC METABOLIC PANEL (8) - Abnormal; Notable for the following components:       Result Value    BUN/CREA Ratio 22.2 (*)     All other components within normal limits   CBC W/ DIFFERENTIAL - Abnormal; Notable for the following components:    WBC 12.7 (*)     Lymphocyte Absolute 4.29 (*)     Monocyte Absolute 1.16 (*)     All other components within normal limits   TROPONIN I HIGH SENSITIVITY - Normal   BNP (B TYPE NATRIURETIC PEPTIDE) - Normal   CBC WITH DIFFERENTIAL WITH PLATELET    Narrative:     The following orders were created for panel order CBC With Differential With Platelet.  Procedure                               Abnormality         Status                     ---------                               -----------          ------                     CBC W/ DIFFERENTIAL[[id number]]          Abnormal            Final result                 Please view results for these tests on the individual orders.   RAINBOW DRAW BLUE     EKG    Rate, intervals and axes as noted on EKG Report.  Rate: 92   Rhythm: Sinus Rhythm  Reading: NSR no benjie no ectopy normal axis  Comparison 10/15/2023  T wave inversion no longer present in anterior leads                  FINDINGS:  CARDIAC/VASC: Normal.  No cardiac silhouette abnormality or cardiomegaly.  Unremarkable pulmonary vasculature.  MEDIAST/JT:   No visible mass or adenopathy.  LUNGS/PLEURA: Minimal linear left basal scarring/atelectasis..  No significant pulmonary parenchymal abnormalities.  No effusion or pleural thickening.  BONES: No fracture or visible bony lesion.  OTHER: Negative.                Impression  CONCLUSION:  1. No acute airspace disease.  Minimal linear left basal scarring/atelectasis.           Dictated by (CST): Timothy Cobos MD on 1/30/2024 at 4:49 PM      Finalized by (CST): Timothy Cobos MD on 1/30/2024 at 4:50 PM                 MDM                        Medical Decision Making  39yo/f w hx and exam as stated c/o chest pain    Non exertional  On/off for \"years\" per patient, no change but present today  Cxr wnl  Trop neg  Bnp neg  PERC neg  Lungs ctab  Overall stable      Plan  Dc to home  Close fu      Amount and/or Complexity of Data Reviewed  External Data Reviewed: notes.     Details: [place], [facility] 2023,  Increased PCWP   Labs:  Decision-making details documented in ED Course.  Radiology:  Decision-making details documented in ED Course.  ECG/medicine tests:  Decision-making details documented in ED Course.    Risk  OTC drugs.  Prescription drug management.  Parenteral controlled substances.        Disposition and Plan     Clinical Impression:  1. Acute chest pain         Disposition:  Discharge  1/30/2024  5:35 pm    Follow-up:  Obie Roper  [address] [place]  [address]  [facility] [address]  [phone]    Call today            Medications Prescribed:  Current Discharge Medication List

## 2024-01-31 LAB
ATRIAL RATE: 92 BPM
P AXIS: 55 DEGREES
P-R INTERVAL: 152 MS
Q-T INTERVAL: 360 MS
QRS DURATION: 88 MS
QTC CALCULATION (BEZET): 445 MS
R AXIS: 16 DEGREES
T AXIS: 16 DEGREES
VENTRICULAR RATE: 92 BPM

## 2024-06-22 ENCOUNTER — APPOINTMENT (OUTPATIENT)
Dept: GENERAL RADIOLOGY | Facility: HOSPITAL | Age: 39
End: 2024-06-22
Attending: EMERGENCY MEDICINE

## 2024-06-22 ENCOUNTER — HOSPITAL ENCOUNTER (EMERGENCY)
Facility: HOSPITAL | Age: 39
Discharge: HOME | End: 2024-06-22
Attending: EMERGENCY MEDICINE

## 2024-06-22 VITALS
OXYGEN SATURATION: 99 % | DIASTOLIC BLOOD PRESSURE: 65 MMHG | TEMPERATURE: 98 F | WEIGHT: 290 LBS | BODY MASS INDEX: 50 KG/M2 | RESPIRATION RATE: 22 BRPM | HEART RATE: 74 BPM | SYSTOLIC BLOOD PRESSURE: 130 MMHG

## 2024-06-22 DIAGNOSIS — R60.0 PERIPHERAL EDEMA: Primary | ICD-10-CM

## 2024-06-22 DIAGNOSIS — R60.9 FLUID RETENTION: ICD-10-CM

## 2024-06-22 LAB
ALBUMIN SERPL-MCNC: 4.1 G/DL (ref 3.2–4.8)
ALBUMIN/GLOB SERPL: 1.3 {RATIO} (ref 1–2)
ALP LIVER SERPL-CCNC: 89 U/L
ALT SERPL-CCNC: 22 U/L
ANION GAP SERPL CALC-SCNC: 5 MMOL/L (ref 0–18)
AST SERPL-CCNC: 24 U/L (ref ?–34)
B-HCG UR QL: NEGATIVE
BASOPHILS # BLD AUTO: 0.04 X10(3) UL (ref 0–0.2)
BASOPHILS NFR BLD AUTO: 0.4 %
BILIRUB SERPL-MCNC: 0.5 MG/DL (ref 0.3–1.2)
BILIRUB UR QL: NEGATIVE
BNP SERPL-MCNC: 57 PG/ML
BUN BLD-MCNC: 10 MG/DL (ref 9–23)
BUN/CREAT SERPL: 12 (ref 10–20)
CALCIUM BLD-MCNC: 9.1 MG/DL (ref 8.7–10.4)
CHLORIDE SERPL-SCNC: 106 MMOL/L (ref 98–112)
CLARITY UR: CLEAR
CO2 SERPL-SCNC: 28 MMOL/L (ref 21–32)
CREAT BLD-MCNC: 0.83 MG/DL
DEPRECATED RDW RBC AUTO: 39 FL (ref 35.1–46.3)
EGFRCR SERPLBLD CKD-EPI 2021: 93 ML/MIN/1.73M2 (ref 60–?)
EOSINOPHIL # BLD AUTO: 0.15 X10(3) UL (ref 0–0.7)
EOSINOPHIL NFR BLD AUTO: 1.5 %
ERYTHROCYTE [DISTWIDTH] IN BLOOD BY AUTOMATED COUNT: 12.3 % (ref 11–15)
GLOBULIN PLAS-MCNC: 3.2 G/DL (ref 2–3.5)
GLUCOSE BLD-MCNC: 89 MG/DL (ref 70–99)
GLUCOSE UR-MCNC: NORMAL MG/DL
HCT VFR BLD AUTO: 40.9 %
HGB BLD-MCNC: 13.8 G/DL
HGB UR QL STRIP.AUTO: NEGATIVE
IMM GRANULOCYTES # BLD AUTO: 0.04 X10(3) UL (ref 0–1)
IMM GRANULOCYTES NFR BLD: 0.4 %
KETONES UR-MCNC: NEGATIVE MG/DL
LEUKOCYTE ESTERASE UR QL STRIP.AUTO: 75
LYMPHOCYTES # BLD AUTO: 2.93 X10(3) UL (ref 1–4)
LYMPHOCYTES NFR BLD AUTO: 28.5 %
MAGNESIUM SERPL-MCNC: 1.7 MG/DL (ref 1.6–2.6)
MCH RBC QN AUTO: 29.5 PG (ref 26–34)
MCHC RBC AUTO-ENTMCNC: 33.7 G/DL (ref 31–37)
MCV RBC AUTO: 87.4 FL
MONOCYTES # BLD AUTO: 0.64 X10(3) UL (ref 0.1–1)
MONOCYTES NFR BLD AUTO: 6.2 %
NEUTROPHILS # BLD AUTO: 6.47 X10 (3) UL (ref 1.5–7.7)
NEUTROPHILS # BLD AUTO: 6.47 X10(3) UL (ref 1.5–7.7)
NEUTROPHILS NFR BLD AUTO: 63 %
NITRITE UR QL STRIP.AUTO: NEGATIVE
OSMOLALITY SERPL CALC.SUM OF ELEC: 287 MOSM/KG (ref 275–295)
PH UR: 8 [PH] (ref 5–8)
PLATELET # BLD AUTO: 329 10(3)UL (ref 150–450)
POTASSIUM SERPL-SCNC: 4.1 MMOL/L (ref 3.5–5.1)
PROT SERPL-MCNC: 7.3 G/DL (ref 5.7–8.2)
PROT UR-MCNC: NEGATIVE MG/DL
RBC # BLD AUTO: 4.68 X10(6)UL
SODIUM SERPL-SCNC: 139 MMOL/L (ref 136–145)
SP GR UR STRIP: 1.02 (ref 1–1.03)
TROPONIN I SERPL HS-MCNC: <3 NG/L
UROBILINOGEN UR STRIP-ACNC: 2
WBC # BLD AUTO: 10.3 X10(3) UL (ref 4–11)
YEAST UR QL: PRESENT /HPF

## 2024-06-22 PROCEDURE — 81025 URINE PREGNANCY TEST: CPT

## 2024-06-22 PROCEDURE — 93005 ELECTROCARDIOGRAM TRACING: CPT

## 2024-06-22 PROCEDURE — 83735 ASSAY OF MAGNESIUM: CPT | Performed by: EMERGENCY MEDICINE

## 2024-06-22 PROCEDURE — 71045 X-RAY EXAM CHEST 1 VIEW: CPT | Performed by: EMERGENCY MEDICINE

## 2024-06-22 PROCEDURE — 99285 EMERGENCY DEPT VISIT HI MDM: CPT

## 2024-06-22 PROCEDURE — 93010 ELECTROCARDIOGRAM REPORT: CPT

## 2024-06-22 PROCEDURE — 81001 URINALYSIS AUTO W/SCOPE: CPT | Performed by: EMERGENCY MEDICINE

## 2024-06-22 PROCEDURE — 96374 THER/PROPH/DIAG INJ IV PUSH: CPT

## 2024-06-22 PROCEDURE — 80053 COMPREHEN METABOLIC PANEL: CPT | Performed by: EMERGENCY MEDICINE

## 2024-06-22 PROCEDURE — 84484 ASSAY OF TROPONIN QUANT: CPT | Performed by: EMERGENCY MEDICINE

## 2024-06-22 PROCEDURE — 85025 COMPLETE CBC W/AUTO DIFF WBC: CPT | Performed by: EMERGENCY MEDICINE

## 2024-06-22 PROCEDURE — 83880 ASSAY OF NATRIURETIC PEPTIDE: CPT | Performed by: EMERGENCY MEDICINE

## 2024-06-22 RX ORDER — BUMETANIDE 0.25 MG/ML
2 INJECTION INTRAMUSCULAR; INTRAVENOUS ONCE
Status: COMPLETED | OUTPATIENT
Start: 2024-06-22 | End: 2024-06-22

## 2024-06-22 RX ORDER — BUMETANIDE 2 MG/1
2 TABLET ORAL DAILY
Qty: 5 TABLET | Refills: 0 | Status: SHIPPED | OUTPATIENT
Start: 2024-06-22 | End: 2024-06-27

## 2024-06-22 NOTE — ED INITIAL ASSESSMENT (HPI)
Kierra arrived through triage for concerns of fluid overload. Reported hx of CHF, she weighs herself daily and noted a 10lb weight gain over the past 7 days. Also reporting progressively worsening BLE edema, abdominal distention, and dyspnea on exertion.

## 2024-06-22 NOTE — ED PROVIDER NOTES
Patient Seen in: [facility] Emergency Department    History     Chief Complaint   Patient presents with    Swelling Edema     Stated Complaint: Fluid overload     HPI    39-year-old female with past medical history of pulmonary hypertension, dyslipidemia, asthma, obesity seen for evaluation with grossly worsening fold edema associated nondistention and exertional/without shortness of breath in setting of having hydrochlorothiazide uptitrated from 12.5 mg to 25 mg 2024.  Noting weight gain of greater than ten pounds over past week; no new meds/dosage changes otherwise. Denies dietary/fluid indiscretion in setting of 64 ounce fluid restriction. EMR reviewed, 2024 TTE preserved LV/RV EF.    Past Medical History:    Asthma (HCC)    Congestive heart disease (HCC)    Lymph edema    Pulmonary hypertension (HCC)       Past Surgical History:   Procedure Laterality Date      2013    Leg/ankle surgery proc unlisted Right     achilles tendon sx            No family history on file.    Social History     Socioeconomic History    Marital status: Single   Tobacco Use    Smoking status: Never    Smokeless tobacco: Never   Vaping Use    Vaping status: Never Used   Substance and Sexual Activity    Alcohol use: Not Currently    Drug use: Not Currently     Social Determinants of Health     Financial Resource Strain: Low Risk  (2024)    Received from [facility]    Overall Financial Resource Strain (CARDIA)     Difficulty of Paying Living Expenses: Not hard at all   Food Insecurity: No Food Insecurity (2024)    Received from [facility]    Hunger Vital Sign     Worried About Running Out of Food in the Last Year: Never true     Ran Out of Food in the Last Year: Never true   Transportation Needs: No Transportation Needs (2024)    Received from [facility]    PRAPARE - Transportation     Lack of Transportation (Medical): No     Lack of  Transportation (Non-Medical): No   Housing Stability: Low Risk  (4/9/2024)    Received from [facility]    Housing Stability Vital Sign     Unable to Pay for Housing in the Last Year: No     Number of Places Lived in the Last Year: 1     In the last 12 months, was there a time when you did not have a steady place to sleep or slept in a shelter (including now)?: No       Review of Systems :  Constitutional: As per HPI  Respiratory: (+) shortness of breath.      Positive for stated complaint: Fluid overload  Other systems are as noted in HPI.  Constitutional and vital signs reviewed.      All other systems reviewed and negative except as noted above.    PSFH elements reviewed from today and agreed except as otherwise stated in HPI.    Physical Exam     ED Triage Vitals [06/22/24 1228]   /72   Pulse 76   Resp 22   Temp 98 °F (36.7 °C)   Temp src Temporal   SpO2 100 %   O2 Device None (Room air)       Current:/72   Pulse 76   Temp 98 °F (36.7 °C) (Temporal)   Resp 22   Wt 131.5 kg   SpO2 100%   BMI 49.78 kg/m²         Physical Exam   Constitutional: No distress. Obese, nontoxic, pleasantly conversational.  HEENT: MMM.  Head: Normocephalic.   Eyes: No injection.   Cardiovascular: RRR.   Pulmonary/Chest: Effort normal. CTAB.  Abdominal: Soft. Nontender.  Musculoskeletal: No gross deformity. BLE with 3+ edema without calf tenderness/palpable cord or asymmetry.  Neurological: Alert.   Skin: Skin is warm.   Psychiatric: Cooperative.  Nursing note and vitals reviewed.        ED Course     Labs Reviewed   URINALYSIS, ROUTINE - Abnormal; Notable for the following components:       Result Value    Urobilinogen Urine 2 (*)     Leukocyte Esterase Urine 75 (*)     Squamous Epi. Cells Few (*)     Yeast Urine Present (*)     All other components within normal limits   COMP METABOLIC PANEL (14) - Normal   BNP (B TYPE NATRIURETIC PEPTIDE) - Normal   MAGNESIUM - Normal   TROPONIN I HIGH SENSITIVITY  - Normal   POCT PREGNANCY URINE - Normal   CBC WITH DIFFERENTIAL WITH PLATELET    Narrative:     The following orders were created for panel order CBC With Differential With Platelet.  Procedure                               Abnormality         Status                     ---------                               -----------         ------                     CBC W/ DIFFERENTIAL[[id number]]                              Final result                 Please view results for these tests on the individual orders.   RAINBOW DRAW BLUE   CBC W/ DIFFERENTIAL     EKG    Rate, intervals and axes as noted on EKG Report.  Rate: 67  Rhythm: Sinus Rhythm  Reading: NSR 67 without SAMANTHA as independently interpreted by myself         XR CHEST AP PORTABLE  (CPT=71045)    Result Date: 6/22/2024  PROCEDURE: XR CHEST AP PORTABLE  (CPT=71045) TIME: 1322.   COMPARISON: XR CHEST PA + LAT CHEST (QFW=[id number]), 3/04/2019, 5:42 PM.  XR CHEST AP PORTABLE (CPT=71045), 1/14/2022, 3:49 PM.  XR CHEST AP/PA (1 VIEW) (CPT=71045), 12/14/2021, 7:22 PM.  XR CHEST AP PORTABLE (CPT=71045), 8/13/2023, 11:07 AM.  XR CHEST AP PORTABLE (CPT=71045), 7/09/2023, 6:36 PM.  XR CHEST AP PORTABLE (CPT=71045), 5/16/2023, 9:22 AM.  CT CHEST PE AORTA (IV ONLY) (CPT=71260), 8/13/2023, 12:50 PM.  [facility], XR CHEST AP PORTABLE (CPT=71045), 1/30/2024, 4:26 PM.  [facility], XR CHEST AP PORTABLE (CPT=71045), 10/15/2023, 3:04 PM.  [facility], XR CHEST AP PORTABLE (CPT=71045), 9/07/2023, 6:49 PM.  [facility], XR CHEST PA + LAT CHEST (EQH=[id number]), 12/22/2018, 6:04 PM.  INDICATIONS: Reports progressively worsening bilateral lower extremity edema, abdominal distention, and shortness of breath on exertion. History of congestive heart failure.  TECHNIQUE:   Single view.   FINDINGS:  CARDIAC/VASC: The cardiomediastinal silhouette is not enlarged. There is mild tortuosity of the thoracic aorta with peripheral atherosclerotic  vascular calcification. The pulmonary vascularity is within normal limits. MEDIAST/JT: No visible mass or adenopathy. LUNGS/PLEURA: No airspace consolidation, pleural effusion, or pneumothorax is evident.  BONES: Mild degenerative changes of the thoracic spine are apparent. There is no fracture or visible bony lesion. OTHER: Leads overlie the chest and obscure underlying detail.           CONCLUSION:  Stable chest without radiographically evident acute intrathoracic process.    Dictated by (CST): Grayson Watts MD on 6/22/2024 at 1:45 PM     Finalized by (CST): Grayson Watts MD on 6/22/2024 at 1:46 PM           Heart Score:    HEART Score      Title      Criteria Score   Age: <45 Age Score: 0   History: Slightly Suspicious Hx Score: 0     EKG: Normal EKG Score: 0   HTN: No   Hypercholesterolemia: No   Atherosclerosis/PVD: No     DM: No   BMI>30kg/m2: Yes   Smoking: No   Family History: No         Other Risk Factor Score: 1             Lab Results   Component Value Date    TROP <0.045 03/04/2019    TROPHS <3 06/22/2024           HEART Score: 1        Risk of adverse cardiac event is 0.9-1.7%            ED Course as of 06/22/24 1436  ------------------------------------------------------------  Time: 06/22 1405  Comment: Resting comfortably, ED course noncauate       MDM   DIFFERENTIAL DIAGNOSIS: After history and physical exam differential diagnosis includes but is not limited to ACS, myocarditis, renal failure, liver failure, nephrotic syndrome, anasarca.    Pulse ox: 100%:Normal on RA, as independently interpreted by myself    Cardiac Monitor Interpretation:   Pulse Readings from Last 1 Encounters:   06/22/24 76   , sinus,      Medical Decision Making  Evaluation for dyspnea/shortness of breath associated with peripheral edema without hypoxia or respiratory distress and low risk HEART/PERC negative patient - labs nonacute; given symptoms/weight gain despite uptitration in hydrochlorothiazide and  dietary/fluid/medication compliance, will intiiate course of bumetanide diuresis with ongoing outpatient follow-up and warning instructions for emergent return.    Problems Addressed:  Peripheral edema: acute illness or injury    Amount and/or Complexity of Data Reviewed  External Data Reviewed: labs, radiology, ECG and notes.     Details: 5/9/2024 outpatient primary care note, 4/1/2024 BMP, 5/21/2024 VTE, 1/30/2024 EKG reviewed  Labs: ordered. Decision-making details documented in ED Course.  Radiology: ordered and independent interpretation performed. Decision-making details documented in ED Course.     Details: CXR without obvious pneumothorax as independently interpreted by myself  ECG/medicine tests: ordered and independent interpretation performed. Decision-making details documented in ED Course.    Risk  Prescription drug management.        I was wearing at minimum a facemask and eye protection throughout this encounter with handwashing performed prior and after patient evaluation without personal hand/facial/oropharyngeal contact and gloves worn throughout encounter. See note and/or contact this provider for further PPE details.    Disposition and Plan     Clinical Impression:  1. Peripheral edema    2. Fluid retention        Disposition:  Discharge    Follow-up:  Xuan Miner DR  DEPT [facility] [address] [phone]    Call  For followup and re-evaluation.      Medications Prescribed:  Discharge Medication List as of 6/22/2024  2:15 PM        START taking these medications    Details   bumetanide 2 MG Oral Tab Take 1 tablet (2 mg total) by mouth daily for 5 days., Normal, Disp-5 tablet, R-0

## 2024-06-23 LAB
ATRIAL RATE: 67 BPM
P AXIS: 24 DEGREES
P-R INTERVAL: 150 MS
Q-T INTERVAL: 388 MS
QRS DURATION: 88 MS
QTC CALCULATION (BEZET): 409 MS
R AXIS: 12 DEGREES
T AXIS: 3 DEGREES
VENTRICULAR RATE: 67 BPM

## 2024-07-28 ENCOUNTER — APPOINTMENT (OUTPATIENT)
Dept: GENERAL RADIOLOGY | Facility: HOSPITAL | Age: 39
End: 2024-07-28
Attending: EMERGENCY MEDICINE
Payer: MEDICAID

## 2024-07-28 ENCOUNTER — HOSPITAL ENCOUNTER (EMERGENCY)
Facility: HOSPITAL | Age: 39
Discharge: HOME | End: 2024-07-28
Attending: EMERGENCY MEDICINE
Payer: MEDICAID

## 2024-07-28 VITALS
HEIGHT: 64.5 IN | BODY MASS INDEX: 47.9 KG/M2 | DIASTOLIC BLOOD PRESSURE: 65 MMHG | TEMPERATURE: 98 F | HEART RATE: 72 BPM | RESPIRATION RATE: 17 BRPM | OXYGEN SATURATION: 99 % | SYSTOLIC BLOOD PRESSURE: 111 MMHG | WEIGHT: 284 LBS

## 2024-07-28 DIAGNOSIS — J40 BRONCHITIS: Primary | ICD-10-CM

## 2024-07-28 LAB
ANION GAP SERPL CALC-SCNC: 7 MMOL/L (ref 0–18)
BASOPHILS # BLD AUTO: 0.03 X10(3) UL (ref 0–0.2)
BASOPHILS NFR BLD AUTO: 0.2 %
BNP SERPL-MCNC: 43 PG/ML
BUN BLD-MCNC: 10 MG/DL (ref 9–23)
BUN/CREAT SERPL: 11.8 (ref 10–20)
CALCIUM BLD-MCNC: 9 MG/DL (ref 8.7–10.4)
CHLORIDE SERPL-SCNC: 114 MMOL/L (ref 98–112)
CO2 SERPL-SCNC: 20 MMOL/L (ref 21–32)
CREAT BLD-MCNC: 0.85 MG/DL
D DIMER PPP FEU-MCNC: 0.41 UG/ML FEU (ref ?–0.5)
DEPRECATED RDW RBC AUTO: 41 FL (ref 35.1–46.3)
EGFRCR SERPLBLD CKD-EPI 2021: 90 ML/MIN/1.73M2 (ref 60–?)
EOSINOPHIL # BLD AUTO: 0.31 X10(3) UL (ref 0–0.7)
EOSINOPHIL NFR BLD AUTO: 2.5 %
ERYTHROCYTE [DISTWIDTH] IN BLOOD BY AUTOMATED COUNT: 12.8 % (ref 11–15)
GLUCOSE BLD-MCNC: 88 MG/DL (ref 70–99)
HCT VFR BLD AUTO: 42.1 %
HGB BLD-MCNC: 13.9 G/DL
IMM GRANULOCYTES # BLD AUTO: 0.06 X10(3) UL (ref 0–1)
IMM GRANULOCYTES NFR BLD: 0.5 %
LYMPHOCYTES # BLD AUTO: 2.84 X10(3) UL (ref 1–4)
LYMPHOCYTES NFR BLD AUTO: 23 %
MCH RBC QN AUTO: 29.1 PG (ref 26–34)
MCHC RBC AUTO-ENTMCNC: 33 G/DL (ref 31–37)
MCV RBC AUTO: 88.1 FL
MONOCYTES # BLD AUTO: 0.96 X10(3) UL (ref 0.1–1)
MONOCYTES NFR BLD AUTO: 7.8 %
NEUTROPHILS # BLD AUTO: 8.16 X10 (3) UL (ref 1.5–7.7)
NEUTROPHILS # BLD AUTO: 8.16 X10(3) UL (ref 1.5–7.7)
NEUTROPHILS NFR BLD AUTO: 66 %
OSMOLALITY SERPL CALC.SUM OF ELEC: 290 MOSM/KG (ref 275–295)
PLATELET # BLD AUTO: 318 10(3)UL (ref 150–450)
POTASSIUM SERPL-SCNC: 4.2 MMOL/L (ref 3.5–5.1)
RBC # BLD AUTO: 4.78 X10(6)UL
SODIUM SERPL-SCNC: 141 MMOL/L (ref 136–145)
WBC # BLD AUTO: 12.4 X10(3) UL (ref 4–11)

## 2024-07-28 PROCEDURE — 83880 ASSAY OF NATRIURETIC PEPTIDE: CPT | Performed by: EMERGENCY MEDICINE

## 2024-07-28 PROCEDURE — 99285 EMERGENCY DEPT VISIT HI MDM: CPT

## 2024-07-28 PROCEDURE — 71045 X-RAY EXAM CHEST 1 VIEW: CPT | Performed by: EMERGENCY MEDICINE

## 2024-07-28 PROCEDURE — 85025 COMPLETE CBC W/AUTO DIFF WBC: CPT | Performed by: EMERGENCY MEDICINE

## 2024-07-28 PROCEDURE — 36415 COLL VENOUS BLD VENIPUNCTURE: CPT

## 2024-07-28 PROCEDURE — 80048 BASIC METABOLIC PNL TOTAL CA: CPT | Performed by: EMERGENCY MEDICINE

## 2024-07-28 PROCEDURE — 99284 EMERGENCY DEPT VISIT MOD MDM: CPT

## 2024-07-28 PROCEDURE — 85379 FIBRIN DEGRADATION QUANT: CPT | Performed by: EMERGENCY MEDICINE

## 2024-07-28 RX ORDER — BENZONATATE 100 MG/1
100 CAPSULE ORAL 3 TIMES DAILY PRN
Qty: 30 CAPSULE | Refills: 0 | Status: SHIPPED | OUTPATIENT
Start: 2024-07-28 | End: 2024-08-27

## 2024-07-28 RX ORDER — DEXAMETHASONE 4 MG/1
8 TABLET ORAL
Qty: 6 TABLET | Refills: 0 | Status: SHIPPED | OUTPATIENT
Start: 2024-07-28 | End: 2024-07-31

## 2024-07-28 NOTE — ED INITIAL ASSESSMENT (HPI)
C/o cough and sally since Wednesday. Hx CHF.   Denies fevers  Reports compliant with medications.   Recently changed antidiuretic   Gained 4 lbs.

## 2024-07-28 NOTE — ED PROVIDER NOTES
Patient Seen in: [facility] Emergency Department    History     Chief Complaint   Patient presents with    Congestive Heart Failure     Stated Complaint: Shortness of breath, productive cough.    HPI    Patient here with cough, congestion for few days.  No travel, no known sick contacts.  Patient with sob, cough occ  productive of sputum.  denies chest pain.   No calf pain or swelling.    DVT Risk factors: bmi        Past Medical History:    Asthma (HCC)    Congestive heart disease (HCC)    Lymph edema    Pulmonary hypertension (HCC)       Past Surgical History:   Procedure Laterality Date      2013    Leg/ankle surgery proc unlisted Right     achilles tendon sx            History reviewed. No pertinent family history.    Social History     Socioeconomic History    Marital status: Single   Tobacco Use    Smoking status: Never    Smokeless tobacco: Never   Vaping Use    Vaping status: Never Used   Substance and Sexual Activity    Alcohol use: Not Currently    Drug use: Not Currently     Social Determinants of Health     Financial Resource Strain: Low Risk  (2024)    Received from [facility]    Overall Financial Resource Strain (CARDIA)     Difficulty of Paying Living Expenses: Not hard at all   Food Insecurity: No Food Insecurity (2024)    Received from [facility]    Hunger Vital Sign     Worried About Running Out of Food in the Last Year: Never true     Ran Out of Food in the Last Year: Never true   Transportation Needs: No Transportation Needs (2024)    Received from [facility]    PRAPARE - Transportation     Lack of Transportation (Medical): No     Lack of Transportation (Non-Medical): No   Housing Stability: Low Risk  (2024)    Received from [facility]    Housing Stability Vital Sign     Unable to Pay for Housing in the Last Year: No     Number of Places Lived in the Last Year: 1     In  the last 12 months, was there a time when you did not have a steady place to sleep or slept in a shelter (including now)?: No       Review of Systems    Positive for stated complaint: Shortness of breath, productive cough.  Other systems are as noted in HPI.  Constitutional and vital signs reviewed.      All other systems reviewed and negative except as noted above.    PSFH elements reviewed from today and agreed except as otherwise stated in HPI.    Physical Exam     ED Triage Vitals [07/28/24 1128]   /80   Pulse 79   Resp 16   Temp 97.7 °F (36.5 °C)   Temp src Oral   SpO2 100 %   O2 Device None (Room air)       Current:/80   Pulse 70   Temp 97.7 °F (36.5 °C) (Oral)   Resp 20   Ht 163.8 cm (5' 4.5\")   Wt 128.8 kg   SpO2 99%   BMI 48.00 kg/m²   PULSE OX nl  GENERAL: awake, non toxic, no sig resp distress  HEAD: normocephalic, atraumatic  EYES: sclera non icteric bilateral, conjunctiva clear  EARS:tm's clear bilateral  NOSE: nasal turbinates boggy  NECK: supple, no adenopathy, no thyromegaly  THROAT: pnd noted, post phaynx injected,  LUNGS:  coarse scattered rhonchi   CARDIO: RRR without murmur  EXTREMITIES: from, 5/5 strength in all 4, no calf tenderness or sig edema,  GI: soft, non-tender, normal bowel sounds  SKIN: good skin turgor, no obvious rashes  Differential to include: URI vs. rhinonsinusitis vs. Bronchitis vs. Pneumonia vs pe vs. chf      ED Course     Labs Reviewed   BASIC METABOLIC PANEL (8) - Abnormal; Notable for the following components:       Result Value    Chloride 114 (*)     CO2 20.0 (*)     All other components within normal limits   CBC W/ DIFFERENTIAL - Abnormal; Notable for the following components:    WBC 12.4 (*)     Neutrophil Absolute Prelim 8.16 (*)     Neutrophil Absolute 8.16 (*)     All other components within normal limits   BNP (B TYPE NATRIURETIC PEPTIDE) - Normal   D-DIMER - Normal   CBC WITH DIFFERENTIAL WITH PLATELET    Narrative:     The following orders were  created for panel order CBC With Differential With Platelet.  Procedure                               Abnormality         Status                     ---------                               -----------         ------                     CBC W/ DIFFERENTIAL[[id number]]          Abnormal            Final result                 Please view results for these tests on the individual orders.       [facility]     Radiology:XR CHEST AP PORTABLE  (CPT=71045)    Result Date: 7/28/2024  CONCLUSION:  No acute cardiopulmonary process.    Dictated by (CST): Domenic Rogel MD on 7/28/2024 at 1:28 PM     Finalized by (CST): Domenic Rogel MD on 7/28/2024 at 1:29 PM           I reviewed xray noted no infiltrates no pneumothorax      Medical Decision Making  Problems Addressed:  Bronchitis: acute illness or injury    Amount and/or Complexity of Data Reviewed  Labs: ordered. Decision-making details documented in ED Course.  Radiology: ordered and independent interpretation performed. Decision-making details documented in ED Course.  Discussion of management or test interpretation with external provider(s): Tylenol for pain    Risk  OTC drugs.  Prescription drug management.          Disposition and Plan     Clinical Impression:  1. Bronchitis        Disposition:  Discharge    Follow-up:  Xuan Miner DR  DEPT [facility] [address] [phone]    Follow up        Medications Prescribed:  Current Discharge Medication List        START taking these medications    Details   benzonatate 100 MG Oral Cap Take 1 capsule (100 mg total) by mouth 3 (three) times daily as needed for cough.  Qty: 30 capsule, Refills: 0      dexamethasone (DECADRON) 4 MG tablet Take 2 tablets (8 mg total) by mouth daily with breakfast for 3 days.  Qty: 6 tablet, Refills: 0

## 2024-07-31 ENCOUNTER — HOSPITAL ENCOUNTER (EMERGENCY)
Facility: HOSPITAL | Age: 39
Discharge: HOME | End: 2024-07-31
Attending: EMERGENCY MEDICINE
Payer: MEDICAID

## 2024-07-31 VITALS
BODY MASS INDEX: 48.51 KG/M2 | RESPIRATION RATE: 20 BRPM | OXYGEN SATURATION: 100 % | HEART RATE: 90 BPM | DIASTOLIC BLOOD PRESSURE: 60 MMHG | HEIGHT: 64 IN | SYSTOLIC BLOOD PRESSURE: 127 MMHG | WEIGHT: 284.13 LBS | TEMPERATURE: 98 F

## 2024-07-31 DIAGNOSIS — J20.9 ACUTE BRONCHITIS WITH WHEEZING: Primary | ICD-10-CM

## 2024-07-31 PROCEDURE — 94640 AIRWAY INHALATION TREATMENT: CPT

## 2024-07-31 PROCEDURE — 99283 EMERGENCY DEPT VISIT LOW MDM: CPT

## 2024-07-31 PROCEDURE — 94799 UNLISTED PULMONARY SVC/PX: CPT

## 2024-07-31 RX ORDER — ALBUTEROL SULFATE 2.5 MG/3ML
2.5 SOLUTION RESPIRATORY (INHALATION) EVERY 4 HOURS PRN
Qty: 30 EACH | Refills: 0 | Status: SHIPPED | OUTPATIENT
Start: 2024-07-31 | End: 2024-08-30

## 2024-07-31 RX ORDER — GUAIFENESIN 600 MG/1
600 TABLET, EXTENDED RELEASE ORAL 2 TIMES DAILY
Qty: 14 TABLET | Refills: 0 | Status: SHIPPED | OUTPATIENT
Start: 2024-07-31 | End: 2024-08-07

## 2024-07-31 RX ORDER — IPRATROPIUM BROMIDE AND ALBUTEROL SULFATE 2.5; .5 MG/3ML; MG/3ML
3 SOLUTION RESPIRATORY (INHALATION) ONCE
Status: COMPLETED | OUTPATIENT
Start: 2024-07-31 | End: 2024-07-31

## 2024-07-31 NOTE — ED PROVIDER NOTES
Patient Seen in: [facility] Emergency Department      History     Chief Complaint   Patient presents with    Cough     Stated Complaint: Cough/URI, dx bronchitis     Subjective:   HPI    39 year old female with h/o asthma, chf, lymphedema, pulm htn who presents with cough/congestion for the past couple days, now with wheezing. She was seen in ER 2 days ago for same, had normal CXR/d-dimer/basic labs and BNP. Pt dc with dx of bronchitis, taking tessalon and decadron. She noticed wheezing in the past 24 hours, feels like there is mucus in her throat that she cannot clear and it is making her cough.     Objective:   Past Medical History:    Asthma (HCC)    Congestive heart disease (HCC)    Lymph edema    Pulmonary hypertension (HCC)              Past Surgical History:   Procedure Laterality Date      2013    Leg/ankle surgery proc unlisted Right     achilles tendon sx                Social History     Socioeconomic History    Marital status: Single   Tobacco Use    Smoking status: Never    Smokeless tobacco: Never   Vaping Use    Vaping status: Never Used   Substance and Sexual Activity    Alcohol use: Not Currently    Drug use: Not Currently     Social Determinants of Health     Financial Resource Strain: Low Risk  (2024)    Received from [facility]    Overall Financial Resource Strain (CARDIA)     Difficulty of Paying Living Expenses: Not hard at all   Food Insecurity: No Food Insecurity (2024)    Received from [facility]    Hunger Vital Sign     Worried About Running Out of Food in the Last Year: Never true     Ran Out of Food in the Last Year: Never true   Transportation Needs: No Transportation Needs (2024)    Received from [facility]    PRAPARE - Transportation     Lack of Transportation (Medical): No     Lack of Transportation (Non-Medical): No   Housing Stability: Low Risk  (2024)    Received from  [facility]    Housing Stability Vital Sign     Unable to Pay for Housing in the Last Year: No     Number of Places Lived in the Last Year: 1     In the last 12 months, was there a time when you did not have a steady place to sleep or slept in a shelter (including now)?: No              Review of Systems    Positive for stated Chief Complaint: Cough    Other systems are as noted in HPI.  Constitutional and vital signs reviewed.      All other systems reviewed and negative except as noted above.    Physical Exam     ED Triage Vitals [07/31/24 0145]   /86   Pulse 85   Resp 22   Temp 97.8 °F (36.6 °C)   Temp src Temporal   SpO2 99 %   O2 Device None (Room air)       Current Vitals:   Vital Signs  BP: 131/86  Pulse: 85  Resp: 22  Temp: 97.8 °F (36.6 °C)  Temp src: Temporal    Oxygen Therapy  SpO2: 99 %  O2 Device: None (Room air)            Physical Exam  Vitals and nursing note reviewed.   Constitutional:       General: She is not in acute distress.     Appearance: Normal appearance. She is well-developed. She is not ill-appearing, toxic-appearing or diaphoretic.   HENT:      Head: Normocephalic and atraumatic.   Eyes:      Conjunctiva/sclera: Conjunctivae normal.      Pupils: Pupils are equal, round, and reactive to light.   Cardiovascular:      Rate and Rhythm: Normal rate and regular rhythm.      Pulses: Normal pulses.      Heart sounds: Normal heart sounds. No murmur heard.  Pulmonary:      Effort: Pulmonary effort is normal. No respiratory distress.      Breath sounds: Normal breath sounds. No wheezing.   Abdominal:      General: There is no distension.      Palpations: Abdomen is soft.      Tenderness: There is no abdominal tenderness. There is no guarding.   Musculoskeletal:         General: No tenderness. Normal range of motion.      Cervical back: Full passive range of motion without pain, normal range of motion and neck supple. No rigidity. Normal range of motion.      Right lower  leg: No edema.      Left lower leg: No edema.   Skin:     General: Skin is warm and dry.      Findings: No rash.   Neurological:      Mental Status: She is alert and oriented to person, place, and time.      GCS: GCS eye subscore is 4. GCS verbal subscore is 5. GCS motor subscore is 6.      Sensory: Sensation is intact. No sensory deficit.      Motor: Motor function is intact. No weakness.   Psychiatric:         Attention and Perception: Attention normal.         Mood and Affect: Mood normal.         Behavior: Behavior normal. Behavior is cooperative.           ED Course   Labs Reviewed - No data to display              MDM      Pulse Ox: 99%, Normal, RA    Cardiac Monitor:   Pulse Readings from Last 1 Encounters:   07/31/24 85   , sinus, normal for rate and rhythm     Prior radiology reviewed: yes - CXR 7/28 - no infiltrate      Medications   ipratropium-albuterol (Duoneb) 0.5-2.5 (3) MG/3ML inhalation solution 3 mL (3 mL Nebulization Given 7/31/24 0302)   albuterol (Ventolin) (5 MG/ML) 0.5% nebulizer solution 5 mg (5 mg Nebulization Given 7/31/24 0401)       Pt with bronchitis and wheezing, feeling better after meds. Recent w/u reviewed. She is on steroids, will add albuterol/cough medicine.      Disposition and Plan     Clinical Impression:  1. Acute bronchitis with wheezing         Disposition:  Discharge  7/31/2024  4:22 am    Follow-up:  Xuan Miner DR  DEPT [facility] [address]  [phone]    Schedule an appointment as soon as possible for a visit  Call for next available appointment          Medications Prescribed:  Current Discharge Medication List        START taking these medications    Details   albuterol (2.5 MG/3ML) 0.083% Inhalation Nebu Soln Take 3 mL (2.5 mg total) by nebulization every 4 (four) hours as needed for Wheezing or Shortness of Breath.  Qty: 30 each, Refills: 0      guaiFENesin  MG Oral Tablet 12 Hr Take 1 tablet (600 mg total) by mouth 2 (two)  times daily for 7 days.  Qty: 14 tablet, Refills: 0

## 2024-07-31 NOTE — ED INITIAL ASSESSMENT (HPI)
Patient arrives from home with reports of persistent cough. Seen 2 days ago for symptoms and dx with bronchitis.

## (undated) NOTE — ED AVS SNAPSHOT
Wilian Ch   MRN: [id number]    Department:  [facility] Emergency Department   Date of Visit:  3/4/2019           Disclosure     Insurance plans vary and the physician(s) referred by the ER may not be covered by your plan.  Please contact yo CARE PHYSICIAN AT ONCE OR RETURN IMMEDIATELY TO THE EMERGENCY DEPARTMENT. If you have been prescribed any medication(s), please fill your prescription right away and begin taking the medication(s) as directed.   If you believe that any of the medications

## (undated) NOTE — ED AVS SNAPSHOT
[facility] Emergency Department    [place] [address]     [address] [place] [address]    Phone:  [phone]    Fax:  [phone]           Henri Esqueda   MRN: [id number]    Department:  [facility] Emergency Department   Date of Visit:  5/17/ You were examined and treated today on an urgent basis only. This was not a substitute for ongoing medical care. Often, one Emergency Department visit does not uncover every injury or illness.  If you have been referred to a primary care or a specialist ph Dominic Buckner [address] [facility] ([address]) [address] [facility] ([address]) [address] [place] [address]) [phone]   [facility] [address] [employer]  ([address] [facility]) 56 Your unique [employer] Access Code: [email or web address]  Expires: 7/16/2017 10:45 PM    If you have questions, you can call [phone] to talk to our [facility] Staff. Remember, [employer] is NOT to be used for urgent needs. For medical emergencies, dial 911.

## (undated) NOTE — ED AVS SNAPSHOT
[facility] Emergency Department    [place] [address]     [address] [place] [address]    Phone:  [phone]    Fax:  [phone]           Velasquez Wagner   MRN: [id number]    Department:  [facility] Emergency Department   Date of Visit:  5/17/ and Class Registration line at [phone] or find a doctor online by visiting [email or web address].    IF THERE IS ANY CHANGE OR WORSENING OF YOUR CONDITION, CALL YOUR PRIMARY CARE PHYSICIAN AT ONCE OR RETURN IMMEDIATELY TO [address] [place] [address].     If

## (undated) NOTE — LETTER
Date & Time: 4/19/2022, 7:43 PM  Patient: Anna Arriaga  Encounter Provider(s):    Aryan Cabral MD       To Whom It May Concern:    Anna Arriaga was seen and treated in our department on 4/19/2022. She is excused from work for 2 days.     If you have any questions or concerns, please do not hesitate to call.        _____________________________  Physician/APC Signature

## (undated) NOTE — ED AVS SNAPSHOT
Brenda Watson   MRN: [id number]    Department:  [facility] Emergency Department   Date of Visit:  10/31/2019           Disclosure     Insurance plans vary and the physician(s) referred by the ER may not be covered by your plan.  Please contact CARE PHYSICIAN AT ONCE OR RETURN IMMEDIATELY TO THE EMERGENCY DEPARTMENT. If you have been prescribed any medication(s), please fill your prescription right away and begin taking the medication(s) as directed.   If you believe that any of the medications

## (undated) NOTE — ED AVS SNAPSHOT
Surinder Boston   MRN: [id number]    Department:  [facility] Emergency Department   Date of Visit:  12/22/2018           Disclosure     Insurance plans vary and the physician(s) referred by the ER may not be covered by your plan.  Please contact CARE PHYSICIAN AT ONCE OR RETURN IMMEDIATELY TO THE EMERGENCY DEPARTMENT. If you have been prescribed any medication(s), please fill your prescription right away and begin taking the medication(s) as directed.   If you believe that any of the medications